# Patient Record
Sex: FEMALE | Race: WHITE | NOT HISPANIC OR LATINO | URBAN - METROPOLITAN AREA
[De-identification: names, ages, dates, MRNs, and addresses within clinical notes are randomized per-mention and may not be internally consistent; named-entity substitution may affect disease eponyms.]

---

## 2023-05-05 ENCOUNTER — INPATIENT (INPATIENT)
Facility: HOSPITAL | Age: 38
LOS: 2 days | Discharge: HOME | DRG: 282 | End: 2023-05-08
Attending: INTERNAL MEDICINE | Admitting: INTERNAL MEDICINE
Payer: MEDICAID

## 2023-05-05 VITALS
SYSTOLIC BLOOD PRESSURE: 165 MMHG | HEIGHT: 64 IN | WEIGHT: 189.6 LBS | OXYGEN SATURATION: 97 % | HEART RATE: 72 BPM | TEMPERATURE: 98 F | DIASTOLIC BLOOD PRESSURE: 89 MMHG | RESPIRATION RATE: 20 BRPM

## 2023-05-05 DIAGNOSIS — K86.1 OTHER CHRONIC PANCREATITIS: ICD-10-CM

## 2023-05-05 LAB
ALBUMIN SERPL ELPH-MCNC: 4.6 G/DL — SIGNIFICANT CHANGE UP (ref 3.5–5.2)
ALP SERPL-CCNC: 191 U/L — HIGH (ref 30–115)
ALT FLD-CCNC: 679 U/L — HIGH (ref 0–41)
ANION GAP SERPL CALC-SCNC: 18 MMOL/L — HIGH (ref 7–14)
AST SERPL-CCNC: 264 U/L — HIGH (ref 0–41)
BASOPHILS # BLD AUTO: 0.02 K/UL — SIGNIFICANT CHANGE UP (ref 0–0.2)
BASOPHILS NFR BLD AUTO: 0.2 % — SIGNIFICANT CHANGE UP (ref 0–1)
BILIRUB DIRECT SERPL-MCNC: 3.1 MG/DL — HIGH (ref 0–0.3)
BILIRUB INDIRECT FLD-MCNC: 0.5 MG/DL — SIGNIFICANT CHANGE UP (ref 0.2–1.2)
BILIRUB SERPL-MCNC: 3.6 MG/DL — HIGH (ref 0.2–1.2)
BUN SERPL-MCNC: 7 MG/DL — LOW (ref 10–20)
CALCIUM SERPL-MCNC: 9.4 MG/DL — SIGNIFICANT CHANGE UP (ref 8.4–10.5)
CHLORIDE SERPL-SCNC: 100 MMOL/L — SIGNIFICANT CHANGE UP (ref 98–110)
CO2 SERPL-SCNC: 21 MMOL/L — SIGNIFICANT CHANGE UP (ref 17–32)
CREAT SERPL-MCNC: 0.7 MG/DL — SIGNIFICANT CHANGE UP (ref 0.7–1.5)
EGFR: 114 ML/MIN/1.73M2 — SIGNIFICANT CHANGE UP
EOSINOPHIL # BLD AUTO: 0 K/UL — SIGNIFICANT CHANGE UP (ref 0–0.7)
EOSINOPHIL NFR BLD AUTO: 0 % — SIGNIFICANT CHANGE UP (ref 0–8)
GLUCOSE SERPL-MCNC: 206 MG/DL — HIGH (ref 70–99)
HCG SERPL QL: NEGATIVE — SIGNIFICANT CHANGE UP
HCT VFR BLD CALC: 43.7 % — SIGNIFICANT CHANGE UP (ref 37–47)
HGB BLD-MCNC: 14.8 G/DL — SIGNIFICANT CHANGE UP (ref 12–16)
IMM GRANULOCYTES NFR BLD AUTO: 0.4 % — HIGH (ref 0.1–0.3)
LACTATE SERPL-SCNC: 1.5 MMOL/L — SIGNIFICANT CHANGE UP (ref 0.7–2)
LIDOCAIN IGE QN: >3000 U/L — HIGH (ref 7–60)
LYMPHOCYTES # BLD AUTO: 1.04 K/UL — LOW (ref 1.2–3.4)
LYMPHOCYTES # BLD AUTO: 8.7 % — LOW (ref 20.5–51.1)
MCHC RBC-ENTMCNC: 30.1 PG — SIGNIFICANT CHANGE UP (ref 27–31)
MCHC RBC-ENTMCNC: 33.9 G/DL — SIGNIFICANT CHANGE UP (ref 32–37)
MCV RBC AUTO: 89 FL — SIGNIFICANT CHANGE UP (ref 81–99)
MONOCYTES # BLD AUTO: 0.44 K/UL — SIGNIFICANT CHANGE UP (ref 0.1–0.6)
MONOCYTES NFR BLD AUTO: 3.7 % — SIGNIFICANT CHANGE UP (ref 1.7–9.3)
NEUTROPHILS # BLD AUTO: 10.38 K/UL — HIGH (ref 1.4–6.5)
NEUTROPHILS NFR BLD AUTO: 87 % — HIGH (ref 42.2–75.2)
NRBC # BLD: 0 /100 WBCS — SIGNIFICANT CHANGE UP (ref 0–0)
PLATELET # BLD AUTO: 342 K/UL — SIGNIFICANT CHANGE UP (ref 130–400)
PMV BLD: 10.2 FL — SIGNIFICANT CHANGE UP (ref 7.4–10.4)
POTASSIUM SERPL-MCNC: 4.3 MMOL/L — SIGNIFICANT CHANGE UP (ref 3.5–5)
POTASSIUM SERPL-SCNC: 4.3 MMOL/L — SIGNIFICANT CHANGE UP (ref 3.5–5)
PROT SERPL-MCNC: 7.8 G/DL — SIGNIFICANT CHANGE UP (ref 6–8)
RBC # BLD: 4.91 M/UL — SIGNIFICANT CHANGE UP (ref 4.2–5.4)
RBC # FLD: 12.8 % — SIGNIFICANT CHANGE UP (ref 11.5–14.5)
SODIUM SERPL-SCNC: 139 MMOL/L — SIGNIFICANT CHANGE UP (ref 135–146)
WBC # BLD: 11.93 K/UL — HIGH (ref 4.8–10.8)
WBC # FLD AUTO: 11.93 K/UL — HIGH (ref 4.8–10.8)

## 2023-05-05 PROCEDURE — 80074 ACUTE HEPATITIS PANEL: CPT

## 2023-05-05 PROCEDURE — 36415 COLL VENOUS BLD VENIPUNCTURE: CPT

## 2023-05-05 PROCEDURE — 87040 BLOOD CULTURE FOR BACTERIA: CPT

## 2023-05-05 PROCEDURE — 80061 LIPID PANEL: CPT

## 2023-05-05 PROCEDURE — 99223 1ST HOSP IP/OBS HIGH 75: CPT

## 2023-05-05 PROCEDURE — 80053 COMPREHEN METABOLIC PANEL: CPT

## 2023-05-05 PROCEDURE — 83735 ASSAY OF MAGNESIUM: CPT

## 2023-05-05 PROCEDURE — 76705 ECHO EXAM OF ABDOMEN: CPT | Mod: 26

## 2023-05-05 PROCEDURE — 88312 SPECIAL STAINS GROUP 1: CPT

## 2023-05-05 PROCEDURE — 74177 CT ABD & PELVIS W/CONTRAST: CPT | Mod: 26,MA

## 2023-05-05 PROCEDURE — 88305 TISSUE EXAM BY PATHOLOGIST: CPT

## 2023-05-05 PROCEDURE — 99285 EMERGENCY DEPT VISIT HI MDM: CPT

## 2023-05-05 PROCEDURE — 85025 COMPLETE CBC W/AUTO DIFF WBC: CPT

## 2023-05-05 RX ORDER — SODIUM CHLORIDE 9 MG/ML
1000 INJECTION, SOLUTION INTRAVENOUS
Refills: 0 | Status: DISCONTINUED | OUTPATIENT
Start: 2023-05-05 | End: 2023-05-05

## 2023-05-05 RX ORDER — PIPERACILLIN AND TAZOBACTAM 4; .5 G/20ML; G/20ML
3.38 INJECTION, POWDER, LYOPHILIZED, FOR SOLUTION INTRAVENOUS EVERY 8 HOURS
Refills: 0 | Status: DISCONTINUED | OUTPATIENT
Start: 2023-05-06 | End: 2023-05-06

## 2023-05-05 RX ORDER — SODIUM CHLORIDE 9 MG/ML
1000 INJECTION, SOLUTION INTRAVENOUS
Refills: 0 | Status: DISCONTINUED | OUTPATIENT
Start: 2023-05-05 | End: 2023-05-07

## 2023-05-05 RX ORDER — ENOXAPARIN SODIUM 100 MG/ML
40 INJECTION SUBCUTANEOUS EVERY 24 HOURS
Refills: 0 | Status: DISCONTINUED | OUTPATIENT
Start: 2023-05-05 | End: 2023-05-08

## 2023-05-05 RX ORDER — KETOROLAC TROMETHAMINE 30 MG/ML
15 SYRINGE (ML) INJECTION ONCE
Refills: 0 | Status: DISCONTINUED | OUTPATIENT
Start: 2023-05-05 | End: 2023-05-05

## 2023-05-05 RX ORDER — PIPERACILLIN AND TAZOBACTAM 4; .5 G/20ML; G/20ML
3.38 INJECTION, POWDER, LYOPHILIZED, FOR SOLUTION INTRAVENOUS ONCE
Refills: 0 | Status: COMPLETED | OUTPATIENT
Start: 2023-05-05 | End: 2023-05-05

## 2023-05-05 RX ORDER — ONDANSETRON 8 MG/1
4 TABLET, FILM COATED ORAL ONCE
Refills: 0 | Status: COMPLETED | OUTPATIENT
Start: 2023-05-05 | End: 2023-05-05

## 2023-05-05 RX ORDER — PANTOPRAZOLE SODIUM 20 MG/1
40 TABLET, DELAYED RELEASE ORAL
Refills: 0 | Status: DISCONTINUED | OUTPATIENT
Start: 2023-05-05 | End: 2023-05-08

## 2023-05-05 RX ORDER — SODIUM CHLORIDE 9 MG/ML
1000 INJECTION INTRAMUSCULAR; INTRAVENOUS; SUBCUTANEOUS ONCE
Refills: 0 | Status: COMPLETED | OUTPATIENT
Start: 2023-05-05 | End: 2023-05-05

## 2023-05-05 RX ORDER — KETOROLAC TROMETHAMINE 30 MG/ML
30 SYRINGE (ML) INJECTION EVERY 6 HOURS
Refills: 0 | Status: DISCONTINUED | OUTPATIENT
Start: 2023-05-05 | End: 2023-05-06

## 2023-05-05 RX ADMIN — Medication 15 MILLIGRAM(S): at 10:15

## 2023-05-05 RX ADMIN — SODIUM CHLORIDE 300 MILLILITER(S): 9 INJECTION, SOLUTION INTRAVENOUS at 20:36

## 2023-05-05 RX ADMIN — ONDANSETRON 4 MILLIGRAM(S): 8 TABLET, FILM COATED ORAL at 10:16

## 2023-05-05 RX ADMIN — SODIUM CHLORIDE 2000 MILLILITER(S): 9 INJECTION INTRAMUSCULAR; INTRAVENOUS; SUBCUTANEOUS at 10:16

## 2023-05-05 RX ADMIN — SODIUM CHLORIDE 300 MILLILITER(S): 9 INJECTION, SOLUTION INTRAVENOUS at 18:00

## 2023-05-05 RX ADMIN — PIPERACILLIN AND TAZOBACTAM 25 GRAM(S): 4; .5 INJECTION, POWDER, LYOPHILIZED, FOR SOLUTION INTRAVENOUS at 20:37

## 2023-05-05 RX ADMIN — ENOXAPARIN SODIUM 40 MILLIGRAM(S): 100 INJECTION SUBCUTANEOUS at 20:37

## 2023-05-05 NOTE — ED ADULT TRIAGE NOTE - CCCP TRG CHIEF CMPLNT
Detail Level: Detailed Consent: The patient's consent was obtained including but not limited to risks of crusting, scabbing, blistering, scarring, darker or lighter pigmentary change, recurrence, incomplete removal and infection. Render Post-Care Instructions In Note?: no Post-Care Instructions: I reviewed with the patient in detail post-care instructions. Patient is to wear sunprotection, and avoid picking at any of the treated lesions. Pt may apply Vaseline to crusted or scabbing areas. Duration Of Freeze Thaw-Cycle (Seconds): 0 Include Z78.9 (Other Specified Conditions Influencing Health Status) As An Associated Diagnosis?: Yes Medical Necessity Clause: This procedure was medically necessary because the lesions that were treated were: Medical Necessity Information: It is in your best interest to select a reason for this procedure from the list below. All of these items fulfill various CMS LCD requirements except the new and changing color options. abdominal pain

## 2023-05-05 NOTE — H&P ADULT - ATTENDING COMMENTS
38 yo female, [place]ymra[place] speaking, with no previous PMH presenting for epigastric pain. Hx goes back to 3 days when pt started having nonbilious non bloody vomiting after which she developed severe epigastric pain associated with diarrhea 2 days ago. Found to have gallstone pancreatitis associated with CBD dilatation.       # Gallstone pancreatitis  # CBD dilatation, choledocholithiasis   - afebrile, WBC of 11k   - Lipase >3000   - ALK Phos 191/ / / TB 3.6/ DB 3.1  - CT AP w IVC; 1.  Acute interstitial edematous pancreatitis; no evidence of pancreatic necrosis or peripancreatic collections. Cholelithiasis with intra-/extrahepatic biliary ductal dilation.   - US Cholelithiasis and gallbladder sludge, without sonographic evidence of acute cholecystitis. CBD dilatation (9 mm). Recommend MRCP to exclude obstructing stone  - NPO for now, advance as tolerated  - IVF: LR @ 300 cc/hr   - pain management: toradol 30 q6h   - advanced GI consult ordered       #DVT: lovenox  #Diet: NPO  # IAT   # PPI: protonix 36 yo female, [place] speaking, with no previous PMH presenting for epigastric pain. Hx goes back to 3 days when pt started having nonbilious non bloody vomiting after which she developed severe epigastric pain associated with diarrhea 2 days ago. Found to have gallstone pancreatitis associated with CBD dilatation.       # Gallstone pancreatitis  # CBD dilatation, choledocholithiasis   - afebrile, WBC of 11k   - Lipase >3000   - ALK Phos 191/ / / TB 3.6/ DB 3.1  - CT AP w IVC; 1.  Acute interstitial edematous pancreatitis; no evidence of pancreatic necrosis or peripancreatic collections. Cholelithiasis with intra-/extrahepatic biliary ductal dilation.   - US Cholelithiasis and gallbladder sludge, without sonographic evidence of acute cholecystitis. CBD dilatation (9 mm). Recommend MRCP to exclude obstructing stone  - NPO for now, advance as tolerated  - IVF: LR @ 250 cc/hr   - pain management: toradol 30 q6h PRN  - advanced GI recs noted:  watch over the weekend. ERCP/EUS on monday. IF gets worse, may consider it sooner.    ALT>AST elevation noted: trend. If does not get better, f/u GI to approve MRI Liver and MRCP. Check Viral Hepatitis Panel.      #DVT: lovenox  #Diet: NPO  # IAT   # PPI: protonix 36 yo female, [place] citizen speaking, with no previous PMH presenting for epigastric pain. Hx goes back to 3 days when pt started having nonbilious non bloody vomiting after which she developed severe epigastric pain associated with diarrhea 2 days ago. Found to have gallstone pancreatitis associated with CBD dilatation.       # Gallstone pancreatitis  # CBD dilatation, choledocholithiasis   - afebrile, WBC of 11k   - Lipase >3000   - ALK Phos 191/ / / TB 3.6/ DB 3.1  - CT AP w IVC; 1.  Acute interstitial edematous pancreatitis; no evidence of pancreatic necrosis or peripancreatic collections. Cholelithiasis with intra-/extrahepatic biliary ductal dilation.   - US Cholelithiasis and gallbladder sludge, without sonographic evidence of acute cholecystitis. CBD dilatation (9 mm). Recommend MRCP to exclude obstructing stone  - NPO for now, advance as tolerated  - IVF: LR @ 250 cc/hr   - pain management: toradol 30 q6h PRN  - Has leukocytosis. Maybe reactional. doubt Cholangitis. Started Zosyn for now. If WBC improves, may d/c Abx.   - advanced GI recs noted:  watch over the weekend. ERCP/EUS on monday. IF gets worse, may consider it sooner.    ALT>AST elevation noted: trend. If does not get better, f/u GI to approve MRI Liver and MRCP. Check Viral Hepatitis Panel.      #DVT: lovenox  #Diet: NPO  # IAT   # PPI: protonix

## 2023-05-05 NOTE — PATIENT PROFILE ADULT - FALL HARM RISK - HARM RISK INTERVENTIONS

## 2023-05-05 NOTE — ED PROVIDER NOTE - OBJECTIVE STATEMENT
37-year-old female, PMH gallbladder stones, p/w intermittent nausea and vomiting and upper epigastric pain x3 days.  + Occasional radiation to back.  + Decreased p.o.  No fever, chest pain, shortness of breath, or urinary symptoms.  + Diarrhea x1 yesterday.  No prior surgery.  LMP now. No sig etoh use. sx have improved.

## 2023-05-05 NOTE — ED ADULT TRIAGE NOTE - CHIEF COMPLAINT QUOTE
Third attempt made to confirm patient appointment NEW pt appointment   c/o abdominal pain with v/d x 3 days

## 2023-05-05 NOTE — CONSULT NOTE ADULT - SUBJECTIVE AND OBJECTIVE BOX
Patient is a 38 y/o female, Icelandic speaking, with no previous PMHx presenting for epigastric pain. She notes pain started months prior but she attributed to gas pains. She developed severe pain three days prior located in the Epigastric area with some radiation towards the right and left upper quadrant. Pain was associated with nausea and emesis along with PO intolerance. No alleviating factors at home. She denies toxic habits.         PAST MEDICAL & SURGICAL HISTORY:  Obesity       MEDICATIONS  (STANDING):  enoxaparin Injectable 40 milliGRAM(s) SubCutaneous every 24 hours  lactated ringers. 1000 milliLiter(s) (300 mL/Hr) IV Continuous <Continuous>  pantoprazole    Tablet 40 milliGRAM(s) Oral before breakfast  piperacillin/tazobactam IVPB. 3.375 Gram(s) IV Intermittent once  piperacillin/tazobactam IVPB.- 3.375 Gram(s) IV Intermittent once    MEDICATIONS  (PRN):  ketorolac   Injectable 30 milliGRAM(s) IV Push every 6 hours PRN Moderate Pain (4 - 6)      Allergies  No Known Allergies        Review of Systems  General:  Denies Fatigue, Denies Fever, Denies Weakness ,Denies Weight Loss   HEENT: Denies Trouble Swallowing ,Denies  Sore Throat , Denies Change in hearing/vision/speech ,Denies Dizziness    Cardio: Denies  Chest Pain , Palpitations    Respiratory: Denies worsening of SOB, Denies Cough  Abdomen: See detailed HPI  Neuro: Denies Headache Denies Dizziness, Denies Paresthesias  MSK: Denies pain in Bones/Joints/Muscles   Psych: Patient denies depression, denies suicidal or homicidal ideations  Integ: Patient Denies rash, or new skin lesions       Vital Signs Last 24 Hrs  T(C): 36.4 (05 May 2023 09:12), Max: 36.4 (05 May 2023 09:12)  T(F): 97.6 (05 May 2023 09:12), Max: 97.6 (05 May 2023 09:12)  HR: 72 (05 May 2023 09:12) (72 - 72)  BP: 165/89 (05 May 2023 09:12) (165/89 - 165/89)  BP(mean): --  RR: 20 (05 May 2023 09:12) (20 - 20)  SpO2: 97% (05 May 2023 09:12) (97% - 97%)    Parameters below as of 05 May 2023 09:12  Patient On (Oxygen Delivery Method): room air    Physical Exam  Gen: NAD  Head: NC/AT, no visible deformity  ENT: PERRLA, Sclera Non Icteric   Cardio: S1/S2 No S3/S4, Regular  Resp: CTA B/L  Abdomen: Soft, ND/ TTP upper abdomen , no guarding   Neuro: AAOx3  Extremities: FROM x 4  Skin: No jaundice, no excoriation         Labs:                        14.8   11.93 )-----------( 342      ( 05 May 2023 10:20 )             43.7       Auto Neutrophil %: 87.0 % (05-05-23 @ 10:20)  Auto Immature Granulocyte %: 0.4 % (05-05-23 @ 10:20)    05-05    139  |  100  |  7<L>  ----------------------------<  206<H>  4.3   |  21  |  0.7      Calcium, Total Serum: 9.4 mg/dL (05-05-23 @ 10:20)      LFTs:             7.8  | 3.6  | 264      ------------------[191     ( 05 May 2023 10:20 )  4.6  | 3.1  | 679         Lipase:>3000  Lactate, Blood: 1.5 mmol/L (05-05-23 @ 10:20)    RADIOLOGY & ADDITIONAL STUDIES:  CT Abdomen and Pelvis w/ IV Cont 05.05.23   IMPRESSION:  1.  Acuteinterstitial edematous pancreatitis; no evidence of pancreatic   necrosis or peripancreatic collections.  2.  Cholelithiasis with intra-/extrahepatic biliary ductal dilation.   Correlate with MRCP.      US Abdomen Upper Quadrant Right 05.05.23   IMPRESSION:  Cholelithiasis and gallbladder sludge, without sonographic evidence of   acute cholecystitis.    CBD dilatation (9 mm). Recommend MRCP to exclude obstructing stone.

## 2023-05-05 NOTE — H&P ADULT - ASSESSMENT
38 yo female, Stateless speaking, with no previous PMH presenting for epigastric pain. Hx goes back to 3 days when pt started having nonbilious non bloody vomiting after which she developed severe epigastric pain associated with diarrhea 2 days ago. Found to have gallstone pancreatitis associated with CBD dilatation.       # Gallstone pancreatitis   - WBC of 11k   - Lipase >3000   - ALK Phos 191/ / / TB 3.6/ DB 3.1  - CT AP w IVC; 1.  Acute interstitial edematous pancreatitis; no evidence of pancreatic necrosis or peripancreatic collections.  - NPO for now, advance as tolerated  - IVF: LR @ 300 cc/hr   - pain management: toradol 30 q6h       # CBD dilatation, r/o cholangitis  - afebrile, WBC 11.9 k  - ALK Phos 191/ / / TB 3.6/ DB 3.1  - Cholelithiasis with intra-/extrahepatic biliary ductal dilation. Correlate with MRCP.  - US Cholelithiasis and gallbladder sludge, without sonographic evidence of acute cholecystitis. CBD dilatation (9 mm). Recommend MRCP to exclude obstructing stone.   - no benefit of MRCP   - will likely need ERCP after pancreatitis resolves  - IV ABX: zosyn   - BCx ordered    - advanced GI consult ordered       #DVT: lovenox  #Diet: NPO  # IAT   # PPI: lovenox  36 yo female, Bangladeshi speaking, with no previous PMH presenting for epigastric pain. Hx goes back to 3 days when pt started having nonbilious non bloody vomiting after which she developed severe epigastric pain associated with diarrhea 2 days ago. Found to have gallstone pancreatitis associated with CBD dilatation.       # Gallstone pancreatitis   - WBC of 11k   - Lipase >3000   - ALK Phos 191/ / / TB 3.6/ DB 3.1  - CT AP w IVC; 1.  Acute interstitial edematous pancreatitis; no evidence of pancreatic necrosis or peripancreatic collections.  - NPO for now, advance as tolerated  - IVF: LR @ 300 cc/hr   - pain management: toradol 30 q6h       # CBD dilatation, r/o cholangitis  - afebrile, WBC 11.9 k  - ALK Phos 191/ / / TB 3.6/ DB 3.1  - Cholelithiasis with intra-/extrahepatic biliary ductal dilation. Correlate with MRCP.  - US Cholelithiasis and gallbladder sludge, without sonographic evidence of acute cholecystitis. CBD dilatation (9 mm). Recommend MRCP to exclude obstructing stone.   - no benefit of MRCP   - will likely need ERCP after pancreatitis resolves  - IV ABX: zosyn   - BCx ordered    - advanced GI consult ordered       #DVT: lovenox  #Diet: NPO  # IAT   # PPI: protonix  36 yo female, [place] speaking, with no previous PMH presenting for epigastric pain. Hx goes back to 3 days when pt started having nonbilious non bloody vomiting after which she developed severe epigastric pain associated with diarrhea 2 days ago. Found to have gallstone pancreatitis associated with CBD dilatation.       # Gallstone pancreatitis  # CBD dilatation, choledocholithiasis   - afebrile, WBC of 11k   - Lipase >3000   - ALK Phos 191/ / / TB 3.6/ DB 3.1  - CT AP w IVC; 1.  Acute interstitial edematous pancreatitis; no evidence of pancreatic necrosis or peripancreatic collections. Cholelithiasis with intra-/extrahepatic biliary ductal dilation.   - US Cholelithiasis and gallbladder sludge, without sonographic evidence of acute cholecystitis. CBD dilatation (9 mm). Recommend MRCP to exclude obstructing stone  - NPO for now, advance as tolerated  - IVF: LR @ 300 cc/hr   - pain management: toradol 30 q6h   - advanced GI consult ordered       #DVT: lovenox  #Diet: NPO  # IAT   # PPI: protonix

## 2023-05-05 NOTE — H&P ADULT - NSHPLABSRESULTS_GEN_ALL_CORE
14.8   11.93 )-----------( 342      ( 05 May 2023 10:20 )             43.7     05-05    139  |  100  |  7<L>  ----------------------------<  206<H>  4.3   |  21  |  0.7    Ca    9.4      05 May 2023 10:20    TPro  7.8  /  Alb  4.6  /  TBili  3.6<H>  /  DBili  3.1<H>  /  AST  264<H>  /  ALT  679<H>  /  AlkPhos  191<H>  05-05

## 2023-05-05 NOTE — ED PROVIDER NOTE - PHYSICAL EXAMINATION
CONSTITUTIONAL: NAD  SKIN: Warm dry  HEAD: NCAT  EYES: NL inspection  ENT: MMM  NECK: Supple; non tender.  CARD: RRR  RESP: CTAB  ABD: Soft, + ttp RUQ and epigastrium, no r/g  EXT: no pedal edema  NEURO: Grossly unremarkable  PSYCH: Cooperative, appropriate.

## 2023-05-05 NOTE — ED PROVIDER NOTE - CARE PLAN
1 Principal Discharge DX:	Pancreatitis  Secondary Diagnosis:	Gall bladder stones  Secondary Diagnosis:	Dilated cbd, acquired

## 2023-05-05 NOTE — ED PROVIDER NOTE - CLINICAL SUMMARY MEDICAL DECISION MAKING FREE TEXT BOX
.    Patient with abdominal pain.  All available lab tests, imaging tests, and EKGs independently reviewed and interpreted by me..  + Dilated CBD, and pancreatitis on CT without necrosis or fluid collection.  Patient likely has gallstone pancreatitis and requires MRCP.  Patient remains hemodynamically stable and comfortable in the emergency department.  No acute events.  Patient admitted to medicine for further management.

## 2023-05-05 NOTE — H&P ADULT - HISTORY OF PRESENT ILLNESS
36 yo female pt with no previous PMH presenting for epigastric pain. Hx goes back to 3 days when pt started having nonbilious non bloody vomiting after which she developed severe epigastric pain associated with diarrhea 2 days ago. Pt never had a similar episode before; however does report on/off  epigastric discomfort for which she had a RUQ US 2 weeks ago and was told she has cholelithiasis. Denies any alcohol intake. No fevers or chills at home.     In ED:   VS stable   Labs significant for WBC of 11k   Lipase   ALK Phos/ AST/ ALT/ TB/ DB   CT AP w IVC   US  36 yo female, Malaysian speaking, with no previous PMH presenting for epigastric pain. Hx goes back to 3 days when pt started having nonbilious non bloody vomiting after which she developed severe epigastric pain associated with diarrhea 2 days ago. Pt unable to tolerate any PO intake 2/2 to pain and vomiting. Pt never had a similar episode before; however does report on/off  epigastric discomfort for which she had a RUQ US 2 weeks ago and was told she has cholelithiasis. Denies any alcohol intake. No fevers or chills at home.     In ED:   VS stable   Labs significant for:   WBC of 11k   Lipase >3000   ALK Phos 191/ / / TB 3.6/ DB 3.1  CT AP w IVC; 1.  Acute interstitial edematous pancreatitis; no evidence of pancreatic necrosis or peripancreatic collections.  2.  Cholelithiasis with intra-/extrahepatic biliary ductal dilation. Correlate with MRCP.    US Cholelithiasis and gallbladder sludge, without sonographic evidence of acute cholecystitis. CBD dilatation (9 mm). Recommend MRCP to exclude obstructing stone.

## 2023-05-06 LAB
ALBUMIN SERPL ELPH-MCNC: 3.6 G/DL — SIGNIFICANT CHANGE UP (ref 3.5–5.2)
ALP SERPL-CCNC: 146 U/L — HIGH (ref 30–115)
ALT FLD-CCNC: 394 U/L — HIGH (ref 0–41)
ANION GAP SERPL CALC-SCNC: 12 MMOL/L — SIGNIFICANT CHANGE UP (ref 7–14)
AST SERPL-CCNC: 98 U/L — HIGH (ref 0–41)
BASOPHILS # BLD AUTO: 0.02 K/UL — SIGNIFICANT CHANGE UP (ref 0–0.2)
BASOPHILS NFR BLD AUTO: 0.2 % — SIGNIFICANT CHANGE UP (ref 0–1)
BILIRUB SERPL-MCNC: 1.3 MG/DL — HIGH (ref 0.2–1.2)
BUN SERPL-MCNC: 7 MG/DL — LOW (ref 10–20)
CALCIUM SERPL-MCNC: 8.4 MG/DL — SIGNIFICANT CHANGE UP (ref 8.4–10.5)
CHLORIDE SERPL-SCNC: 101 MMOL/L — SIGNIFICANT CHANGE UP (ref 98–110)
CO2 SERPL-SCNC: 24 MMOL/L — SIGNIFICANT CHANGE UP (ref 17–32)
CREAT SERPL-MCNC: 0.6 MG/DL — LOW (ref 0.7–1.5)
EGFR: 118 ML/MIN/1.73M2 — SIGNIFICANT CHANGE UP
EOSINOPHIL # BLD AUTO: 0.04 K/UL — SIGNIFICANT CHANGE UP (ref 0–0.7)
EOSINOPHIL NFR BLD AUTO: 0.4 % — SIGNIFICANT CHANGE UP (ref 0–8)
GLUCOSE SERPL-MCNC: 114 MG/DL — HIGH (ref 70–99)
HAV IGM SER-ACNC: SIGNIFICANT CHANGE UP
HBV CORE IGM SER-ACNC: SIGNIFICANT CHANGE UP
HBV SURFACE AG SER-ACNC: SIGNIFICANT CHANGE UP
HCT VFR BLD CALC: 37.3 % — SIGNIFICANT CHANGE UP (ref 37–47)
HCV AB S/CO SERPL IA: 0.11 S/CO — SIGNIFICANT CHANGE UP (ref 0–0.99)
HCV AB SERPL-IMP: SIGNIFICANT CHANGE UP
HGB BLD-MCNC: 12.2 G/DL — SIGNIFICANT CHANGE UP (ref 12–16)
IMM GRANULOCYTES NFR BLD AUTO: 0.4 % — HIGH (ref 0.1–0.3)
LYMPHOCYTES # BLD AUTO: 1.9 K/UL — SIGNIFICANT CHANGE UP (ref 1.2–3.4)
LYMPHOCYTES # BLD AUTO: 19 % — LOW (ref 20.5–51.1)
MAGNESIUM SERPL-MCNC: 1.7 MG/DL — LOW (ref 1.8–2.4)
MCHC RBC-ENTMCNC: 29.7 PG — SIGNIFICANT CHANGE UP (ref 27–31)
MCHC RBC-ENTMCNC: 32.7 G/DL — SIGNIFICANT CHANGE UP (ref 32–37)
MCV RBC AUTO: 90.8 FL — SIGNIFICANT CHANGE UP (ref 81–99)
MONOCYTES # BLD AUTO: 0.85 K/UL — HIGH (ref 0.1–0.6)
MONOCYTES NFR BLD AUTO: 8.5 % — SIGNIFICANT CHANGE UP (ref 1.7–9.3)
NEUTROPHILS # BLD AUTO: 7.16 K/UL — HIGH (ref 1.4–6.5)
NEUTROPHILS NFR BLD AUTO: 71.5 % — SIGNIFICANT CHANGE UP (ref 42.2–75.2)
NRBC # BLD: 0 /100 WBCS — SIGNIFICANT CHANGE UP (ref 0–0)
PLATELET # BLD AUTO: 266 K/UL — SIGNIFICANT CHANGE UP (ref 130–400)
PMV BLD: 10.4 FL — SIGNIFICANT CHANGE UP (ref 7.4–10.4)
POTASSIUM SERPL-MCNC: 3.8 MMOL/L — SIGNIFICANT CHANGE UP (ref 3.5–5)
POTASSIUM SERPL-SCNC: 3.8 MMOL/L — SIGNIFICANT CHANGE UP (ref 3.5–5)
PROT SERPL-MCNC: 6.2 G/DL — SIGNIFICANT CHANGE UP (ref 6–8)
RBC # BLD: 4.11 M/UL — LOW (ref 4.2–5.4)
RBC # FLD: 12.9 % — SIGNIFICANT CHANGE UP (ref 11.5–14.5)
SODIUM SERPL-SCNC: 137 MMOL/L — SIGNIFICANT CHANGE UP (ref 135–146)
WBC # BLD: 10.01 K/UL — SIGNIFICANT CHANGE UP (ref 4.8–10.8)
WBC # FLD AUTO: 10.01 K/UL — SIGNIFICANT CHANGE UP (ref 4.8–10.8)

## 2023-05-06 PROCEDURE — 99233 SBSQ HOSP IP/OBS HIGH 50: CPT

## 2023-05-06 PROCEDURE — 99232 SBSQ HOSP IP/OBS MODERATE 35: CPT

## 2023-05-06 RX ORDER — KETOROLAC TROMETHAMINE 30 MG/ML
15 SYRINGE (ML) INJECTION EVERY 8 HOURS
Refills: 0 | Status: DISCONTINUED | OUTPATIENT
Start: 2023-05-06 | End: 2023-05-08

## 2023-05-06 RX ORDER — MAGNESIUM SULFATE 500 MG/ML
2 VIAL (ML) INJECTION ONCE
Refills: 0 | Status: COMPLETED | OUTPATIENT
Start: 2023-05-06 | End: 2023-05-06

## 2023-05-06 RX ORDER — IBUPROFEN 200 MG
400 TABLET ORAL EVERY 8 HOURS
Refills: 0 | Status: DISCONTINUED | OUTPATIENT
Start: 2023-05-06 | End: 2023-05-08

## 2023-05-06 RX ADMIN — SODIUM CHLORIDE 250 MILLILITER(S): 9 INJECTION, SOLUTION INTRAVENOUS at 05:02

## 2023-05-06 RX ADMIN — PANTOPRAZOLE SODIUM 40 MILLIGRAM(S): 20 TABLET, DELAYED RELEASE ORAL at 05:02

## 2023-05-06 RX ADMIN — SODIUM CHLORIDE 250 MILLILITER(S): 9 INJECTION, SOLUTION INTRAVENOUS at 12:16

## 2023-05-06 RX ADMIN — ENOXAPARIN SODIUM 40 MILLIGRAM(S): 100 INJECTION SUBCUTANEOUS at 21:05

## 2023-05-06 RX ADMIN — Medication 25 GRAM(S): at 12:38

## 2023-05-06 RX ADMIN — SODIUM CHLORIDE 250 MILLILITER(S): 9 INJECTION, SOLUTION INTRAVENOUS at 17:28

## 2023-05-06 RX ADMIN — SODIUM CHLORIDE 250 MILLILITER(S): 9 INJECTION, SOLUTION INTRAVENOUS at 14:10

## 2023-05-06 NOTE — PROGRESS NOTE ADULT - SUBJECTIVE AND OBJECTIVE BOX
Gastroenterology progress note:     Patient is a 37y old  Female who presents with a chief complaint of Gallstone pancreatitis (06 May 2023 09:28)       Admitted on: 05-05-23    We are following the patient for pancreatitis      Interval History:  feeling better   abdominal pain improving   passing gas        PAST MEDICAL & SURGICAL HISTORY:      MEDICATIONS  (STANDING):  enoxaparin Injectable 40 milliGRAM(s) SubCutaneous every 24 hours  lactated ringers. 1000 milliLiter(s) (250 mL/Hr) IV Continuous <Continuous>  pantoprazole    Tablet 40 milliGRAM(s) Oral before breakfast  piperacillin/tazobactam IVPB.. 3.375 Gram(s) IV Intermittent every 8 hours    MEDICATIONS  (PRN):  ketorolac   Injectable 30 milliGRAM(s) IV Push every 6 hours PRN Moderate Pain (4 - 6)      Allergies  No Known Allergies      Review of Systems:   Cardiovascular:  No Chest Pain, No Palpitations  Respiratory:  No Cough, No Dyspnea  Gastrointestinal:  As described in HPI    Physical Examination:  T(C): 37.4 (05-06-23 @ 04:43), Max: 37.4 (05-06-23 @ 04:43)  HR: 89 (05-06-23 @ 04:43) (76 - 89)  BP: 139/69 (05-06-23 @ 04:43) (119/59 - 139/69)  RR: 18 (05-06-23 @ 04:43) (18 - 18)  SpO2: 99% (05-05-23 @ 17:11) (99% - 99%)  Weight (kg): 106 (05-05-23 @ 20:48)    Constitutional: No acute distress.  Respiratory:  No signs of respiratory distress. Lung sounds are clear bilaterally.  Cardiovascular:  S1 S2, Regular rate and rhythm.  Abdominal: Abdomen is soft, symmetric, and non-tender without distention.      Data:                        12.2   10.01 )-----------( 266      ( 06 May 2023 07:38 )             37.3     Hgb trend:  12.2  05-06-23 @ 07:38  14.8  05-05-23 @ 10:20      05-06    137  |  101  |  7<L>  ----------------------------<  114<H>  3.8   |  24  |  0.6<L>    Ca    8.4      06 May 2023 07:38  Mg     1.7     05-06    TPro  6.2  /  Alb  3.6  /  TBili  1.3<H>  /  DBili  x   /  AST  98<H>  /  ALT  394<H>  /  AlkPhos  146<H>  05-06    Liver panel trend:  TBili 1.3   /   AST 98   /      /   AlkP 146   /   Tptn 6.2   /   Alb 3.6    /   DBili --      05-06  TBili 3.6   /      /      /   AlkP 191   /   Tptn 7.8   /   Alb 4.6    /   DBili 3.1      05-05             Radiology:  CT Abdomen and Pelvis w/ IV Cont:   ACC: [id number] EXAM:  CT ABDOMEN AND PELVIS IC   ORDERED BY: SHIV VAZQUEZ     PROCEDURE DATE:  05/05/2023          INTERPRETATION:  CLINICAL STATEMENT: Abdominal pain    TECHNIQUE: Contiguous axial CT images were obtained from the lower chest   to the pubic symphysis following administration of 100cc Omnipaque 350   intravenous contrast.  Oral contrast was not administered.  Reformatted   images in the coronal and sagittal planes were acquired.    COMPARISON CT: None.    OTHER STUDIES USED FOR CORRELATION: None.      FINDINGS:    LOWER CHEST: Bibasilar subsegmental dependent atelectasis.    HEPATOBILIARY: Mild diffuse hepatic steatosis. Cholelithiasis. Dilated   common bile duct measures 1.3 cm; mild central intrahepatic biliary   ductal dilation.    SPLEEN: Unremarkable.    PANCREAS: Diffuse peripancreatic fat stranding. Homogeneous enhancement   of the parenchyma noted. No peripancreatic fluid collections. Patent   splenic vein.    ADRENAL GLANDS: Unremarkable.    KIDNEYS: Symmetric renal enhancement bilaterally. No hydronephrosis.    ABDOMINOPELVIC NODES: No lymphadenopathy.    PELVIC ORGANS: Small calcified uterine fibroid.    PERITONEUM/MESENTERY/BOWEL: No bowel obstruction, ascites or   pneumoperitoneum. Normal appendix. Nonspecific dense ingested material   within the distal ileum.    BONES/SOFT TISSUES: No acute osseous abnormality. Degenerative changes at   the pubic symphysis and right sacroiliac joint. Tiny fat-containing   umbilical hernia.      IMPRESSION:  1.  Acuteinterstitial edematous pancreatitis; no evidence of pancreatic   necrosis or peripancreatic collections.  2.  Cholelithiasis with intra-/extrahepatic biliary ductal dilation.   Correlate with MRCP.    --- End of Report ---            YELITZA SNYDER MD;Attending Radiologist  This document has been electronically signed. May  5 2023  1:11PM (05-05-23 @ 12:49)    US Abdomen Upper Quadrant Right:   ACC: [id number] EXAM:  US ABDOMEN RT UPR QUADRANT   ORDERED BY: SHIVANGE VAZQUEZ     PROCEDURE DATE:  05/05/2023          INTERPRETATION:  CLINICAL INFORMATION: Right upper quadrant/epigastric   pain    COMPARISON: None available.    TECHNIQUE: Sonography of the right upper quadrant.    FINDINGS:  Liver: Increased echogenicity.  Bile ducts: Normal caliber. Common bile duct measures 9 mm.  Gallbladder: Cholelithiasis and gallbladder sludge.  No focal tenderness   or evidence of cholecystitis.  Pancreas: Visualized portions are within normal limits.  Right kidney: 9.4 cm. No hydronephrosis.  Ascites: None.  IVC: Visualized portions are within normal limits.    IMPRESSION:  Cholelithiasis and gallbladder sludge, without sonographic evidence of   acute cholecystitis.    CBD dilatation (9 mm). Recommend MRCP to exclude obstructing stone.    --- End of Report ---            MARKO DEGROOT MD; Attending Radiologist  This document has been electronically signed. May  5 2023 11:35AM (05-05-23 @ 10:57)     Gastroenterology progress note:     Patient is a 37y old  Female who presents with a chief complaint of Gallstone pancreatitis (06 May 2023 09:28)       Admitted on: 05-05-23    We are following the patient for pancreatitis      Interval History:  feeling better   abdominal pain improving   passing gas        PAST MEDICAL & SURGICAL HISTORY:      MEDICATIONS  (STANDING):  enoxaparin Injectable 40 milliGRAM(s) SubCutaneous every 24 hours  lactated ringers. 1000 milliLiter(s) (250 mL/Hr) IV Continuous <Continuous>  pantoprazole    Tablet 40 milliGRAM(s) Oral before breakfast  piperacillin/tazobactam IVPB.. 3.375 Gram(s) IV Intermittent every 8 hours    MEDICATIONS  (PRN):  ketorolac   Injectable 30 milliGRAM(s) IV Push every 6 hours PRN Moderate Pain (4 - 6)      Allergies  No Known Allergies      Review of Systems:   Cardiovascular:  No Chest Pain, No Palpitations  Respiratory:  No Cough, No Dyspnea  Gastrointestinal:  As described in HPI    Physical Examination:  T(C): 37.4 (05-06-23 @ 04:43), Max: 37.4 (05-06-23 @ 04:43)  HR: 89 (05-06-23 @ 04:43) (76 - 89)  BP: 139/69 (05-06-23 @ 04:43) (119/59 - 139/69)  RR: 18 (05-06-23 @ 04:43) (18 - 18)  SpO2: 99% (05-05-23 @ 17:11) (99% - 99%)  Weight (kg): 106 (05-05-23 @ 20:48)    Constitutional: No acute distress.  Respiratory:  No signs of respiratory distress. Lung sounds are clear bilaterally.  Cardiovascular:  S1 S2, Regular rate and rhythm.  Abdominal: Abdomen is soft, symmetric, and non-tender without distention.      Data:                        12.2   10.01 )-----------( 266      ( 06 May 2023 07:38 )             37.3     Hgb trend:  12.2  05-06-23 @ 07:38  14.8  05-05-23 @ 10:20      05-06    137  |  101  |  7<L>  ----------------------------<  114<H>  3.8   |  24  |  0.6<L>    Ca    8.4      06 May 2023 07:38  Mg     1.7     05-06    TPro  6.2  /  Alb  3.6  /  TBili  1.3<H>  /  DBili  x   /  AST  98<H>  /  ALT  394<H>  /  AlkPhos  146<H>  05-06    Liver panel trend:  TBili 1.3   /   AST 98   /      /   AlkP 146   /   Tptn 6.2   /   Alb 3.6    /   DBili --      05-06  TBili 3.6   /      /      /   AlkP 191   /   Tptn 7.8   /   Alb 4.6    /   DBili 3.1      05-05             Radiology:  CT Abdomen and Pelvis w/ IV Cont:   ACC: [id number] EXAM:  CT ABDOMEN AND PELVIS IC   ORDERED BY: SHIV VAZQUEZ     PROCEDURE DATE:  05/05/2023          INTERPRETATION:  CLINICAL STATEMENT: Abdominal pain    TECHNIQUE: Contiguous axial CT images were obtained from the lower chest   to the pubic symphysis following administration of 100cc Omnipaque 350   intravenous contrast.  Oral contrast was not administered.  Reformatted   images in the coronal and sagittal planes were acquired.    COMPARISON CT: None.    OTHER STUDIES USED FOR CORRELATION: None.      FINDINGS:    LOWER CHEST: Bibasilar subsegmental dependent atelectasis.    HEPATOBILIARY: Mild diffuse hepatic steatosis. Cholelithiasis. Dilated   common bile duct measures 1.3 cm; mild central intrahepatic biliary   ductal dilation.    SPLEEN: Unremarkable.    PANCREAS: Diffuse peripancreatic fat stranding. Homogeneous enhancement   of the parenchyma noted. No peripancreatic fluid collections. Patent   splenic vein.    ADRENAL GLANDS: Unremarkable.    KIDNEYS: Symmetric renal enhancement bilaterally. No hydronephrosis.    ABDOMINOPELVIC NODES: No lymphadenopathy.    PELVIC ORGANS: Small calcified uterine fibroid.    PERITONEUM/MESENTERY/BOWEL: No bowel obstruction, ascites or   pneumoperitoneum. Normal appendix. Nonspecific dense ingested material   within the distal ileum.    BONES/SOFT TISSUES: No acute osseous abnormality. Degenerative changes at   the pubic symphysis and right sacroiliac joint. Tiny fat-containing   umbilical hernia.      IMPRESSION:  1.  Acute interstitial edematous pancreatitis; no evidence of pancreatic   necrosis or peripancreatic collections.  2.  Cholelithiasis with intra-/extrahepatic biliary ductal dilation.   Correlate with MRCP.    --- End of Report ---            YELITZA SNYDER MD;Attending Radiologist  This document has been electronically signed. May  5 2023  1:11PM (05-05-23 @ 12:49)    US Abdomen Upper Quadrant Right:   ACC: [id number] EXAM:  US ABDOMEN RT UPR QUADRANT   ORDERED BY: SHIV VAZQUEZ     PROCEDURE DATE:  05/05/2023          INTERPRETATION:  CLINICAL INFORMATION: Right upper quadrant/epigastric   pain    COMPARISON: None available.    TECHNIQUE: Sonography of the right upper quadrant.    FINDINGS:  Liver: Increased echogenicity.  Bile ducts: Normal caliber. Common bile duct measures 9 mm.  Gallbladder: Cholelithiasis and gallbladder sludge.  No focal tenderness   or evidence of cholecystitis.  Pancreas: Visualized portions are within normal limits.  Right kidney: 9.4 cm. No hydronephrosis.  Ascites: None.  IVC: Visualized portions are within normal limits.    IMPRESSION:  Cholelithiasis and gallbladder sludge, without sonographic evidence of   acute cholecystitis.    CBD dilatation (9 mm). Recommend MRCP to exclude obstructing stone.    --- End of Report ---            MARKO DEGROOT MD; Attending Radiologist  This document has been electronically signed. May  5 2023 11:35AM (05-05-23 @ 10:57)

## 2023-05-06 NOTE — PROGRESS NOTE ADULT - SUBJECTIVE AND OBJECTIVE BOX
BREANNE KATI  37y  Female      Patient is a 37y old  Female who presents with a chief complaint of Gallstone pancreatitis (05 May 2023 17:10)      INTERVAL HPI/OVERNIGHT EVENTS:    Vital Signs Last 24 Hrs  T(C): 37.4 (06 May 2023 04:43), Max: 37.4 (06 May 2023 04:43)  T(F): 99.3 (06 May 2023 04:43), Max: 99.3 (06 May 2023 04:43)  HR: 89 (06 May 2023 04:43) (76 - 89)  BP: 139/69 (06 May 2023 04:43) (119/59 - 139/69)  BP(mean): 96 (06 May 2023 04:43) (96 - 96)  RR: 18 (06 May 2023 04:43) (18 - 18)  SpO2: 99% (05 May 2023 17:11) (99% - 99%)    Parameters below as of 06 May 2023 04:43  Patient On (Oxygen Delivery Method): room air      PHYSICAL EXAM:  GENERAL: NAD, well-groomed, well-developed  PSYCH: no agitation, baseline mentation  NERVOUS SYSTEM:  Alert & Oriented X3, Motor Strength 5/5 B/L upper and lower extremities; Sensory intact; FTN WNL  PULMONARY: Clear to percussion bilaterally; No rales, rhonchi, wheezing, or rubs  CARDIOVASCULAR: Regular rate and rhythm; No murmurs, rubs, or gallops  GI: Soft, Nontender, Nondistended; Bowel sounds present  EXTREMITIES:  2+ Peripheral Pulses, No clubbing, cyanosis, or edema  LYMPH: No lymphadenopathy noted  SKIN: No rashes or lesions    Consultant(s) Notes Reviewed:  [x ] YES  [ ] NO    Discussed with Consultants/Other Providers [ x] YES     LABS                          14.8   11.93 )-----------( 342      ( 05 May 2023 10:20 )             43.7     05-05    139  |  100  |  7<L>  ----------------------------<  206<H>  4.3   |  21  |  0.7    Ca    9.4      05 May 2023 10:20    TPro  7.8  /  Alb  4.6  /  TBili  3.6<H>  /  DBili  3.1<H>  /  AST  264<H>  /  ALT  679<H>  /  AlkPhos  191<H>  05-05      Lactate Trend  05-05 @ 10:20 Lactate:1.5       RADIOLOGY & ADDITIONAL TESTS:    Imaging Personally Reviewed:  [ ] YES  [ ] NO           BREANNE KATI  37y  Female      Patient is a 37y old  Female who presents with a chief complaint of Gallstone pancreatitis (05 May 2023 17:10)      INTERVAL HPI/OVERNIGHT EVENTS:  Pt feels well today, denied any pain/nausea/vomiting. Afebrile.     Vital Signs Last 24 Hrs  T(C): 37.4 (06 May 2023 04:43), Max: 37.4 (06 May 2023 04:43)  T(F): 99.3 (06 May 2023 04:43), Max: 99.3 (06 May 2023 04:43)  HR: 89 (06 May 2023 04:43) (76 - 89)  BP: 139/69 (06 May 2023 04:43) (119/59 - 139/69)  BP(mean): 96 (06 May 2023 04:43) (96 - 96)  RR: 18 (06 May 2023 04:43) (18 - 18)  SpO2: 99% (05 May 2023 17:11) (99% - 99%)    Parameters below as of 06 May 2023 04:43  Patient On (Oxygen Delivery Method): room air      PHYSICAL EXAM:  GENERAL: NAD, well-groomed, obese  PSYCH: no agitation, baseline mentation  NERVOUS SYSTEM:  Alert & Oriented X3, Motor Strength 5/5 B/L upper and lower extremities; Sensory intact; FTN WNL  PULMONARY: Clear to percussion bilaterally; No rales, rhonchi, wheezing, or rubs  CARDIOVASCULAR: Regular rate and rhythm; No murmurs, rubs, or gallops  GI: Soft, Nontender, Nondistended; Bowel sounds present  EXTREMITIES:  2+ Peripheral Pulses, No clubbing, cyanosis, or edema  LYMPH: No lymphadenopathy noted  SKIN: No rashes or lesions    Consultant(s) Notes Reviewed:  [x ] YES  [ ] NO    Discussed with Consultants/Other Providers [ x] YES     LABS                          14.8   11.93 )-----------( 342      ( 05 May 2023 10:20 )             43.7     05-05    139  |  100  |  7<L>  ----------------------------<  206<H>  4.3   |  21  |  0.7    Ca    9.4      05 May 2023 10:20    TPro  7.8  /  Alb  4.6  /  TBili  3.6<H>  /  DBili  3.1<H>  /  AST  264<H>  /  ALT  679<H>  /  AlkPhos  191<H>  05-05      Lactate Trend  05-05 @ 10:20 Lactate:1.5       RADIOLOGY & ADDITIONAL TESTS:    Imaging Personally Reviewed:  [ ] YES  [ ] NO           KATI RAYMUNDO  37y  Female      Patient is a 37y old  Female who presents with a chief complaint of Gallstone pancreatitis (05 May 2023 17:10)    : Pt refused  services; translation provided by her .     INTERVAL HPI/OVERNIGHT EVENTS:  Pt feels well today, denied any pain/nausea/vomiting. Afebrile.     Vital Signs Last 24 Hrs  T(C): 37.4 (06 May 2023 04:43), Max: 37.4 (06 May 2023 04:43)  T(F): 99.3 (06 May 2023 04:43), Max: 99.3 (06 May 2023 04:43)  HR: 89 (06 May 2023 04:43) (76 - 89)  BP: 139/69 (06 May 2023 04:43) (119/59 - 139/69)  BP(mean): 96 (06 May 2023 04:43) (96 - 96)  RR: 18 (06 May 2023 04:43) (18 - 18)  SpO2: 99% (05 May 2023 17:11) (99% - 99%)    Parameters below as of 06 May 2023 04:43  Patient On (Oxygen Delivery Method): room air      PHYSICAL EXAM:  GENERAL: NAD, well-groomed, obese  PSYCH: no agitation, baseline mentation  NERVOUS SYSTEM:  Alert & Oriented X3, Motor Strength 5/5 B/L upper and lower extremities; Sensory intact; FTN WNL  PULMONARY: Clear to percussion bilaterally; No rales, rhonchi, wheezing, or rubs  CARDIOVASCULAR: Regular rate and rhythm; No murmurs, rubs, or gallops  GI: Soft, Nontender, Nondistended; Bowel sounds present  EXTREMITIES:  2+ Peripheral Pulses, No clubbing, cyanosis, or edema  LYMPH: No lymphadenopathy noted  SKIN: No rashes or lesions    Consultant(s) Notes Reviewed:  [x ] YES  [ ] NO    Discussed with Consultants/Other Providers [ x] YES     LABS                          14.8   11.93 )-----------( 342      ( 05 May 2023 10:20 )             43.7     05-05    139  |  100  |  7<L>  ----------------------------<  206<H>  4.3   |  21  |  0.7    Ca    9.4      05 May 2023 10:20    TPro  7.8  /  Alb  4.6  /  TBili  3.6<H>  /  DBili  3.1<H>  /  AST  264<H>  /  ALT  679<H>  /  AlkPhos  191<H>  05-05      Lactate Trend  05-05 @ 10:20 Lactate:1.5       RADIOLOGY & ADDITIONAL TESTS:    Imaging Personally Reviewed:  [ ] YES  [ ] NO

## 2023-05-06 NOTE — PROGRESS NOTE ADULT - ASSESSMENT
36 y/o female, Bhutanese speaking, with no previous PMHx presenting for epigastric pain admitted for pancreatitis. [facility] was consulted for pancreatitis and elevated LFT    #)Epigastric pain/Gallstone Pancreatitis  #)Acute interstitial pancreatitis (secondary to  gall stones) improving   #)Elevated LFT mixed pattern with predominantly hepatocellular with hyperbilurubinemia r/o choledocholithiasis   -drinks wine occasional not every day   -Hemodynamically stable/ No signs of sepsis  -US showed +GS, CBD 9mm  -CT scan showed CBD 1.3cm, hepatic steatosis, Diffuse peripancreatic fat stranding.  -Lipase>3000  -normal calcium, no new medications   -LFT at the time of admission 3.6/191/264/679, trending down today 1.3/146/98/394    Rec:  can start on low fat diet   check lipid profile   c/w IVF LR can taper IV fluids once start tolerating diet   Monitor daily HCT/BUN/CR/urine output.  Monitor pain scale everyday and give pain medications accordingly.  Avoid alcohol and pancreatitis causing medications.  plan for ERCP monday   If concern for cholangitis or Biliary obstruction need emergent ERCP  surgery evaluation for cholecystectomy  38 y/o female, Iranian speaking, with no previous PMHx presenting for epigastric pain admitted for pancreatitis. [facility] was consulted for pancreatitis and elevated LFT    #)Epigastric pain/Gallstone Pancreatitis  #)Acute interstitial pancreatitis (secondary to  gall stones) improving   #)Elevated LFT mixed pattern with predominantly hepatocellular with hyperbilirubinemia r/o choledocholithiasis   -drinks wine occasional not every day   -Hemodynamically stable/ No signs of sepsis  -US showed +GS, CBD 9mm  -CT scan showed CBD 1.3cm, hepatic steatosis, Diffuse peripancreatic fat stranding.  -Lipase>3000  -normal calcium, no new medications   -LFT at the time of admission 3.6/191/264/679, trending down today 1.3/146/98/394    Rec:  can start on low fat diet   check lipid profile   c/w IVF LR can taper IV fluids once start tolerating diet   Monitor daily HCT/BUN/CR/urine output.  Monitor pain scale everyday and give pain medications accordingly.  Avoid alcohol and pancreatitis causing medications.  will consider EUS+/-  ERCP Monday based on patient clinical picture and LFTs , keep NPO Sunday after midnight   If concern for cholangitis or Biliary obstruction need emergent ERCP  surgery evaluation for cholecystectomy

## 2023-05-06 NOTE — PROGRESS NOTE ADULT - ASSESSMENT
36 yo female, Kittitian speaking, with no previous PMH presenting for epigastric pain. Hx goes back to 3 days when pt started having nonbilious non bloody vomiting after which she developed severe epigastric pain associated with diarrhea 2 days ago. Found to have gallstone pancreatitis associated with CBD dilatation.       # Gallstone pancreatitis  # CBD dilatation, choledocholithiasis   - afebrile, WBC of 11k   - Lipase >3000   - ALK Phos 191/ / / TB 3.6/ DB 3.1  - CT AP w IVC; 1.  Acute interstitial edematous pancreatitis; no evidence of pancreatic necrosis or peripancreatic collections. Cholelithiasis with intra-/extrahepatic biliary ductal dilation.   - US Cholelithiasis and gallbladder sludge, without sonographic evidence of acute cholecystitis. CBD dilatation (9 mm). Recommend MRCP to exclude obstructing stone  - NPO for now, advance as tolerated  - IVF: LR @ 250 cc/hr   - pain management: toradol 30 q6h PRN  - Has leukocytosis. Maybe reactional. doubt Cholangitis. Started Zosyn for now. If WBC improves, may d/c Abx.   - [facility] recs noted: watch over the weekend. ERCP/EUS on Monday. IF gets worse, may consider it sooner.    ALT>AST elevation noted: trend. If does not get better, f/u GI to approve MRI Liver and MRCP. Check Viral Hepatitis Panel.      #DVT: lovenox  #Diet: NPO  # IAT   # PPI: protonix.    38 yo female, Czech speaking, with no previous PMH presenting for epigastric pain. Hx goes back to 3 days when pt started having nonbilious non bloody vomiting after which she developed severe epigastric pain associated with diarrhea 2 days ago. Found to have gallstone pancreatitis associated with CBD dilatation.       # Gallstone pancreatitis  # CBD dilatation, choledocholithiasis   - afebrile, WBC of 11k   - Lipase >3000   - ALK Phos 191/ / / TB 3.6/ DB 3.1  - CT AP w IVC; 1.  Acute interstitial edematous pancreatitis; no evidence of pancreatic necrosis or peripancreatic collections. Cholelithiasis with intra-/extrahepatic biliary ductal dilation.   - US Cholelithiasis and gallbladder sludge, without sonographic evidence of acute cholecystitis. CBD dilatation (9 mm). Recommend MRCP to exclude obstructing stone  - Advance to low fat diet today  - IVF: LR @ 250 cc/hr   - pain management: Decrease Toradol to 15mg q8hrly prn   - DC Abx as no signs/symptoms of cholangitis  - Check lipid profile.   - [facility] recs noted: watch over the weekend. ERCP/EUS on Monday. IF gets worse, may consider it sooner.    ALT>AST elevation noted: trending down  - Check Viral Hepatitis Panel.      #DVT: lovenox  #Diet: Low fat  # IAT   # PPI: protonix.     Pending: ERCP/EUS on Monday   36 yo female, Puerto Rican speaking, with no previous PMH presenting for epigastric pain. Hx goes back to 3 days when pt started having nonbilious non bloody vomiting after which she developed severe epigastric pain associated with diarrhea 2 days ago. Found to have gallstone pancreatitis associated with CBD dilatation.       # Gallstone pancreatitis  # CBD dilatation, choledocholithiasis   - afebrile, WBC of 11k   - Lipase >3000   - ALK Phos 191/ / / TB 3.6/ DB 3.1  - CT AP w IVC; 1.  Acute interstitial edematous pancreatitis; no evidence of pancreatic necrosis or peripancreatic collections. Cholelithiasis with intra-/extrahepatic biliary ductal dilation.   - US Cholelithiasis and gallbladder sludge, without sonographic evidence of acute cholecystitis. CBD dilatation (9 mm). Recommend MRCP to exclude obstructing stone  - Advance to low fat diet today  - IVF: LR @ 250 cc/hr   - pain management: Decrease Toradol to 15mg q8hrly prn   - DC Abx as no signs/symptoms of cholangitis  - Check lipid profile.   - [facility] GI recs noted: watch over the weekend. ERCP/EUS on Monday. IF gets worse, may consider it sooner.  - Surgery consult for possible cholecystectomy    ALT>AST elevation noted: trending down  - Check Viral Hepatitis Panel.      #DVT: lovenox  #Diet: Low fat  # IAT   # PPI: protonix.     Pending: ERCP/EUS on Monday   36 yo female, Palestinian speaking, with no previous PMH presenting for epigastric pain. Hx goes back to 3 days when pt started having nonbilious non bloody vomiting after which she developed severe epigastric pain associated with diarrhea 2 days ago. Found to have gallstone pancreatitis associated with CBD dilatation.       # Gallstone pancreatitis  # CBD dilatation, choledocholithiasis   - afebrile, WBC of 11k   - Lipase >3000   - ALK Phos 191/ / / TB 3.6/ DB 3.1  - CT AP w IVC; 1.  Acute interstitial edematous pancreatitis; no evidence of pancreatic necrosis or peripancreatic collections. Cholelithiasis with intra-/extrahepatic biliary ductal dilation.   - US Cholelithiasis and gallbladder sludge, without sonographic evidence of acute cholecystitis. CBD dilatation (9 mm). Recommend MRCP to exclude obstructing stone  - Advance to low fat diet today  - IVF: LR @ 250 cc/hr : taper once able to tolerate PO  - pain management: Decrease Toradol to 15mg q8hrly prn   - DC Abx as no signs/symptoms of cholangitis  - Check lipid profile.   - [facility] recs noted: watch over the weekend. ERCP/EUS on Monday. IF gets worse, may consider it sooner.  - Surgery consult for possible cholecystectomy    ALT>AST elevation noted: trending down  - Check Viral Hepatitis Panel.    Hypomagnesemia  - replete mg today      #DVT: lovenox  #Diet: Low fat  # IAT   # PPI: protonix.     Pending: ERCP/EUS on Monday

## 2023-05-07 LAB
ALBUMIN SERPL ELPH-MCNC: 3.8 G/DL — SIGNIFICANT CHANGE UP (ref 3.5–5.2)
ALP SERPL-CCNC: 136 U/L — HIGH (ref 30–115)
ALT FLD-CCNC: 284 U/L — HIGH (ref 0–41)
ANION GAP SERPL CALC-SCNC: 13 MMOL/L — SIGNIFICANT CHANGE UP (ref 7–14)
AST SERPL-CCNC: 50 U/L — HIGH (ref 0–41)
BASOPHILS # BLD AUTO: 0.03 K/UL — SIGNIFICANT CHANGE UP (ref 0–0.2)
BASOPHILS NFR BLD AUTO: 0.3 % — SIGNIFICANT CHANGE UP (ref 0–1)
BILIRUB SERPL-MCNC: 0.9 MG/DL — SIGNIFICANT CHANGE UP (ref 0.2–1.2)
BUN SERPL-MCNC: 5 MG/DL — LOW (ref 10–20)
CALCIUM SERPL-MCNC: 8.4 MG/DL — SIGNIFICANT CHANGE UP (ref 8.4–10.5)
CHLORIDE SERPL-SCNC: 103 MMOL/L — SIGNIFICANT CHANGE UP (ref 98–110)
CHOLEST SERPL-MCNC: 199 MG/DL — SIGNIFICANT CHANGE UP
CO2 SERPL-SCNC: 25 MMOL/L — SIGNIFICANT CHANGE UP (ref 17–32)
CREAT SERPL-MCNC: 0.5 MG/DL — LOW (ref 0.7–1.5)
EGFR: 124 ML/MIN/1.73M2 — SIGNIFICANT CHANGE UP
EOSINOPHIL # BLD AUTO: 0.08 K/UL — SIGNIFICANT CHANGE UP (ref 0–0.7)
EOSINOPHIL NFR BLD AUTO: 0.8 % — SIGNIFICANT CHANGE UP (ref 0–8)
GLUCOSE SERPL-MCNC: 120 MG/DL — HIGH (ref 70–99)
HCT VFR BLD CALC: 38.7 % — SIGNIFICANT CHANGE UP (ref 37–47)
HDLC SERPL-MCNC: 32 MG/DL — LOW
HGB BLD-MCNC: 12.8 G/DL — SIGNIFICANT CHANGE UP (ref 12–16)
IMM GRANULOCYTES NFR BLD AUTO: 0.3 % — SIGNIFICANT CHANGE UP (ref 0.1–0.3)
LIPID PNL WITH DIRECT LDL SERPL: 130 MG/DL — HIGH
LYMPHOCYTES # BLD AUTO: 2.36 K/UL — SIGNIFICANT CHANGE UP (ref 1.2–3.4)
LYMPHOCYTES # BLD AUTO: 24.3 % — SIGNIFICANT CHANGE UP (ref 20.5–51.1)
MAGNESIUM SERPL-MCNC: 2.1 MG/DL — SIGNIFICANT CHANGE UP (ref 1.8–2.4)
MCHC RBC-ENTMCNC: 30 PG — SIGNIFICANT CHANGE UP (ref 27–31)
MCHC RBC-ENTMCNC: 33.1 G/DL — SIGNIFICANT CHANGE UP (ref 32–37)
MCV RBC AUTO: 90.6 FL — SIGNIFICANT CHANGE UP (ref 81–99)
MONOCYTES # BLD AUTO: 0.79 K/UL — HIGH (ref 0.1–0.6)
MONOCYTES NFR BLD AUTO: 8.1 % — SIGNIFICANT CHANGE UP (ref 1.7–9.3)
NEUTROPHILS # BLD AUTO: 6.41 K/UL — SIGNIFICANT CHANGE UP (ref 1.4–6.5)
NEUTROPHILS NFR BLD AUTO: 66.2 % — SIGNIFICANT CHANGE UP (ref 42.2–75.2)
NON HDL CHOLESTEROL: 167 MG/DL — HIGH
NRBC # BLD: 0 /100 WBCS — SIGNIFICANT CHANGE UP (ref 0–0)
PLATELET # BLD AUTO: 293 K/UL — SIGNIFICANT CHANGE UP (ref 130–400)
PMV BLD: 10.3 FL — SIGNIFICANT CHANGE UP (ref 7.4–10.4)
POTASSIUM SERPL-MCNC: 4 MMOL/L — SIGNIFICANT CHANGE UP (ref 3.5–5)
POTASSIUM SERPL-SCNC: 4 MMOL/L — SIGNIFICANT CHANGE UP (ref 3.5–5)
PROT SERPL-MCNC: 6.5 G/DL — SIGNIFICANT CHANGE UP (ref 6–8)
RBC # BLD: 4.27 M/UL — SIGNIFICANT CHANGE UP (ref 4.2–5.4)
RBC # FLD: 12.5 % — SIGNIFICANT CHANGE UP (ref 11.5–14.5)
SODIUM SERPL-SCNC: 141 MMOL/L — SIGNIFICANT CHANGE UP (ref 135–146)
TRIGL SERPL-MCNC: 182 MG/DL — HIGH
WBC # BLD: 9.7 K/UL — SIGNIFICANT CHANGE UP (ref 4.8–10.8)
WBC # FLD AUTO: 9.7 K/UL — SIGNIFICANT CHANGE UP (ref 4.8–10.8)

## 2023-05-07 PROCEDURE — 99233 SBSQ HOSP IP/OBS HIGH 50: CPT

## 2023-05-07 PROCEDURE — 99232 SBSQ HOSP IP/OBS MODERATE 35: CPT

## 2023-05-07 RX ORDER — SODIUM CHLORIDE 9 MG/ML
1000 INJECTION, SOLUTION INTRAVENOUS
Refills: 0 | Status: DISCONTINUED | OUTPATIENT
Start: 2023-05-08 | End: 2023-05-08

## 2023-05-07 RX ADMIN — ENOXAPARIN SODIUM 40 MILLIGRAM(S): 100 INJECTION SUBCUTANEOUS at 22:13

## 2023-05-07 RX ADMIN — PANTOPRAZOLE SODIUM 40 MILLIGRAM(S): 20 TABLET, DELAYED RELEASE ORAL at 05:03

## 2023-05-07 NOTE — PROGRESS NOTE ADULT - SUBJECTIVE AND OBJECTIVE BOX
KATI RAYMUNDO  37y Female    CHIEF COMPLAINT:    Patient is a 37y old  Female who presents with a chief complaint of Gallstone pancreatitis (07 May 2023 11:59)      INTERVAL HPI/OVERNIGHT EVENTS:    Patient seen and examined. No acute events overnight.     ROS: All other systems are negative.    Vital Signs:    T(F): 98.6 (05-07-23 @ 13:20), Max: 98.6 (05-07-23 @ 13:20)  HR: 87 (05-07-23 @ 13:20) (87 - 101)  BP: 144/72 (05-07-23 @ 13:20) (127/73 - 157/89)  RR: 18 (05-07-23 @ 13:20) (18 - 18)  SpO2: --  I&O's Summary    07 May 2023 07:01  -  07 May 2023 16:56  --------------------------------------------------------  IN: 360 mL / OUT: 0 mL / NET: 360 mL      Daily     Daily   CAPILLARY BLOOD GLUCOSE          PHYSICAL EXAM:    GENERAL:  NAD  SKIN: No rashes or lesions  HEENT: Atraumatic. Normocephalic. PERRL. Moist membranes.  NECK: Supple, No JVD. No lymphadenopathy.  PULMONARY: CTA B/L. No wheezing. No rales  CVS: Normal S1, S2. Rate and Rhythm are regular. No murmurs.  ABDOMEN/GI: Soft, Nontender, Nondistended; BS present  MSK:  No edema B/L LE.  NEUROLOGIC:  No motor or sensory deficit.  PSYCH: Alert & oriented x 3, normal affect    Consultant(s) Notes Reviewed:  [x ] YES  [ ] NO  Care Discussed with Consultants/Other Providers [ x] YES  [ ] NO    LABS:                        12.8   9.70  )-----------( 293      ( 07 May 2023 06:35 )             38.7     05-07    141  |  103  |  5<L>  ----------------------------<  120<H>  4.0   |  25  |  0.5<L>    Ca    8.4      07 May 2023 06:35  Mg     2.1     05-07    TPro  6.5  /  Alb  3.8  /  TBili  0.9  /  DBili  x   /  AST  50<H>  /  ALT  284<H>  /  AlkPhos  136<H>  05-07            Culture - Blood (collected 05 May 2023 17:07)  Source: .Blood None  Preliminary Report (07 May 2023 01:02):    No growth to date.    Culture - Blood (collected 05 May 2023 17:07)  Source: .Blood None  Preliminary Report (07 May 2023 01:02):    No growth to date.        RADIOLOGY & ADDITIONAL TESTS:      Imaging or report Personally Reviewed:  [x] YES  [ ] NO  EKG reviewed: [x] YES  [ ] NO    Medications:  Standing  enoxaparin Injectable 40 milliGRAM(s) SubCutaneous every 24 hours  pantoprazole    Tablet 40 milliGRAM(s) Oral before breakfast    PRN Meds  ibuprofen  Tablet. 400 milliGRAM(s) Oral every 8 hours PRN  ketorolac   Injectable 15 milliGRAM(s) IV Push every 8 hours PRN

## 2023-05-07 NOTE — PROGRESS NOTE ADULT - ASSESSMENT
36 yo female, Botswanan speaking, with no previous PMH presenting for epigastric pain. Hx goes back to 3 days when pt started having nonbilious non bloody vomiting after which she developed severe epigastric pain associated with diarrhea 2 days ago. Found to have gallstone pancreatitis associated with CBD dilatation.       # Gallstone pancreatitis-improving  # CBD dilatation, choledocholithiasis   - afebrile, WBC of 11k   - Lipase >3000   - ALK Phos 191/ / / TB 3.6/ DB 3.1  - CT AP w IVC; 1.  Acute interstitial edematous pancreatitis; no evidence of pancreatic necrosis or peripancreatic collections. Cholelithiasis with intra-/extrahepatic biliary ductal dilation.   - US Cholelithiasis and gallbladder sludge, without sonographic evidence of acute cholecystitis. CBD dilatation (9 mm). Recommend MRCP to exclude obstructing stone  - Advance to low fat diet today, npo after midnight for scope   - pain control   - off Abx as no signs/symptoms of cholangitis  - Check lipid profile.   - advanced GI recs noted: watch over the weekend. ERCP/EUS on Monday. IF gets worse, may consider it sooner.  - Surgery consult for possible cholecystectomy    ALT>AST elevation noted: trending down  -Hepatitis Panel negative    Hypomagnesemia-repleted      #DVT: lovenox  #Diet: Low fat, npo after midnight  # IAT   # PPI: protonix.     Pending: ERCP/EUS on Monday

## 2023-05-07 NOTE — PROGRESS NOTE ADULT - SUBJECTIVE AND OBJECTIVE BOX
Gastroenterology progress note:     Patient is a 37y old  Female who presents with a chief complaint of Gallstone pancreatitis (06 May 2023 10:02)       Admitted on: 05-05-23    We are following the patient for pancreatitis      Interval History:  abdominal pain improved   tolerating diet        PAST MEDICAL & SURGICAL HISTORY:      MEDICATIONS  (STANDING):  enoxaparin Injectable 40 milliGRAM(s) SubCutaneous every 24 hours  pantoprazole    Tablet 40 milliGRAM(s) Oral before breakfast    MEDICATIONS  (PRN):  ibuprofen  Tablet. 400 milliGRAM(s) Oral every 8 hours PRN Mild Pain (1 - 3), Moderate Pain (4 - 6)  ketorolac   Injectable 15 milliGRAM(s) IV Push every 8 hours PRN Severe Pain (7 - 10)      Allergies  No Known Allergies      Review of Systems:   Cardiovascular:  No Chest Pain, No Palpitations  Respiratory:  No Cough, No Dyspnea  Gastrointestinal:  As described in HPI    Physical Examination:  T(C): 36.9 (05-07-23 @ 04:50), Max: 36.9 (05-07-23 @ 04:50)  HR: 91 (05-07-23 @ 04:50) (91 - 101)  BP: 127/73 (05-07-23 @ 04:50) (127/73 - 157/89)  RR: 18 (05-07-23 @ 04:50) (18 - 18)  SpO2: --      05-07-23 @ 07:01  -  05-07-23 @ 12:00  --------------------------------------------------------  IN: 120 mL / OUT: 0 mL / NET: 120 mL      Constitutional: No acute distress.  Respiratory:  No signs of respiratory distress. Lung sounds are clear bilaterally.  Cardiovascular:  S1 S2, Regular rate and rhythm.  Abdominal: Abdomen is soft, symmetric, and non-tender without distention.         Data:                        12.8   9.70  )-----------( 293      ( 07 May 2023 06:35 )             38.7     Hgb trend:  12.8  05-07-23 @ 06:35  12.2  05-06-23 @ 07:38  14.8  05-05-23 @ 10:20        05-07    141  |  103  |  5<L>  ----------------------------<  120<H>  4.0   |  25  |  0.5<L>    Ca    8.4      07 May 2023 06:35  Mg     2.1     05-07    TPro  6.5  /  Alb  3.8  /  TBili  0.9  /  DBili  x   /  AST  50<H>  /  ALT  284<H>  /  AlkPhos  136<H>  05-07    Liver panel trend:  TBili 0.9   /   AST 50   /      /   AlkP 136   /   Tptn 6.5   /   Alb 3.8    /   DBili --      05-07  TBili 1.3   /   AST 98   /      /   AlkP 146   /   Tptn 6.2   /   Alb 3.6    /   DBili --      05-06  TBili 3.6   /      /      /   AlkP 191   /   Tptn 7.8   /   Alb 4.6    /   DBili 3.1      05-05          Culture - Blood (collected 05 May 2023 17:07)  Source: .Blood None  Preliminary Report (07 May 2023 01:02):    No growth to date.    Culture - Blood (collected 05 May 2023 17:07)  Source: .Blood None  Preliminary Report (07 May 2023 01:02):    No growth to date.         Radiology:

## 2023-05-07 NOTE — PROGRESS NOTE ADULT - ASSESSMENT
36 y/o female, Emirati speaking, with no previous PMHx presenting for epigastric pain admitted for pancreatitis. [facility] was consulted for pancreatitis and elevated LFT    #)Epigastric pain/Gallstone Pancreatitis  #)Acute interstitial pancreatitis (secondary to  gall stones) improving   #)Elevated LFT mixed pattern with predominantly hepatocellular with hyperbilirubinemia r/o choledocholithiasis   -drinks wine occasional not every day   -Hemodynamically stable/ No signs of sepsis  -US showed +GS, CBD 9mm  -CT scan showed CBD 1.3cm, hepatic steatosis, Diffuse peripancreatic fat stranding.  -Lipase>3000  -normal calcium, no new medications   -LFT at the time of admission 3.6/191/264/679    Today:   Abdominal pain improved   LFT trending down 0.9/136/50/284    Rec:  c/w low fat diet   check lipid profile   taper IVF   Monitor daily HCT/BUN/CR/urine output.  Monitor pain scale everyday and give pain medications accordingly.  Avoid alcohol and pancreatitis causing medications.  keep NPO after midnight today,   will consider EUS+/-  ERCP tomorrow based on patient clinical picture and LFTs  If concern for cholangitis or Biliary obstruction need emergent ERCP  surgery evaluation for cholecystectomy  36 y/o female, Montserratian speaking, with no previous PMHx presenting for epigastric pain admitted for pancreatitis. [facility] was consulted for pancreatitis and elevated LFT    #)Epigastric pain/Gallstone Pancreatitis  #)Acute interstitial pancreatitis (secondary to  gall stones) improving   #)Elevated LFT mixed pattern with predominantly hepatocellular with hyperbilirubinemia likely due to passed stone r/o choledocholithiasis   -drinks wine occasional not every day   -Hemodynamically stable/ No signs of sepsis  -US showed +GS, CBD 9mm  -CT scan showed CBD 1.3cm, hepatic steatosis, Diffuse peripancreatic fat stranding.  -Lipase>3000  -normal calcium, no new medications   -LFT at the time of admission 3.6/191/264/679    Today:   Abdominal pain improved   LFT trending down 0.9/136/50/284 likely passed stone  tolerating diet   Acute hepatitis panel negative     Rec:  c/w low fat diet   check lipid profile   taper IVF   Monitor daily HCT/BUN/CR/urine output.  Monitor pain scale everyday and give pain medications accordingly.  Avoid alcohol and pancreatitis causing medications.  keep NPO after midnight today,   will consider EUS+/-  ERCP tomorrow based on patient clinical picture and LFTs  If concern for cholangitis or Biliary obstruction need emergent ERCP  surgery evaluation for cholecystectomy  36 y/o female, Tunisian speaking, with no previous PMHx presenting for epigastric pain admitted for pancreatitis. [facility] was consulted for pancreatitis and elevated LFT    #)Epigastric pain/Gallstone Pancreatitis  #)Acute interstitial pancreatitis (secondary to  gall stones) improving   #)Elevated LFT mixed pattern with predominantly hepatocellular with hyperbilirubinemia likely due to passed stone r/o choledocholithiasis   -drinks wine occasional not every day   -Hemodynamically stable/ No signs of sepsis  -US showed +GS, CBD 9mm  -CT scan showed CBD 1.3cm, hepatic steatosis, Diffuse peripancreatic fat stranding.  -Lipase>3000  -normal calcium, no new medications   -LFT at the time of admission 3.6/191/264/679    Today:   Abdominal pain improved   LFT trending down 0.9/136/50/284 likely passed stone  tolerating diet   Acute hepatitis panel negative       Rec:  c/w low fat diet   taper IVF   Monitor daily HCT/BUN/CR/urine output.  Monitor pain scale everyday and give pain medications accordingly.  Avoid alcohol and pancreatitis causing medications.  keep NPO after midnight today,   will consider EUS+/-  ERCP tomorrow based on patient clinical picture and LFTs  If concern for cholangitis or Biliary obstruction need emergent ERCP  surgery evaluation for cholecystectomy

## 2023-05-08 ENCOUNTER — TRANSCRIPTION ENCOUNTER (OUTPATIENT)
Age: 38
End: 2023-05-08

## 2023-05-08 ENCOUNTER — RESULT REVIEW (OUTPATIENT)
Age: 38
End: 2023-05-08

## 2023-05-08 VITALS — HEIGHT: 64 IN | WEIGHT: 233.69 LBS

## 2023-05-08 PROCEDURE — 88312 SPECIAL STAINS GROUP 1: CPT | Mod: 26

## 2023-05-08 PROCEDURE — 99221 1ST HOSP IP/OBS SF/LOW 40: CPT

## 2023-05-08 PROCEDURE — 43237 ENDOSCOPIC US EXAM ESOPH: CPT

## 2023-05-08 PROCEDURE — 43239 EGD BIOPSY SINGLE/MULTIPLE: CPT | Mod: XU

## 2023-05-08 PROCEDURE — 99233 SBSQ HOSP IP/OBS HIGH 50: CPT

## 2023-05-08 PROCEDURE — 88305 TISSUE EXAM BY PATHOLOGIST: CPT | Mod: 26

## 2023-05-08 RX ADMIN — SODIUM CHLORIDE 100 MILLILITER(S): 9 INJECTION, SOLUTION INTRAVENOUS at 00:53

## 2023-05-08 NOTE — DISCHARGE NOTE PROVIDER - DISCHARGE DIET
DASH Diet/Low Sodium Diet/Low Fiber Diet DASH Diet/Low Sodium Diet/Consistent Carbohydrate Diabetic Diets/Low Fiber Diet

## 2023-05-08 NOTE — DISCHARGE NOTE NURSING/CASE MANAGEMENT/SOCIAL WORK - PATIENT PORTAL LINK FT
You can access the FollowMyHealth Patient Portal offered by [facility] by registering at the following website: [email or web address]. By joining [facility]’s FollowMyHealth portal, you will also be able to view your health information using other applications (apps) compatible with our system.

## 2023-05-08 NOTE — PROGRESS NOTE ADULT - ASSESSMENT
36 yo female, Welsh speaking, with no previous PMH presenting for epigastric pain. Hx goes back to 3 days when pt started having nonbilious non bloody vomiting after which she developed severe epigastric pain associated with diarrhea 2 days ago. Found to have gallstone pancreatitis associated with CBD dilatation.       # Gallstone pancreatitis-improving  # CBD dilatation, choledocholithiasis   - afebrile, WBC of 11k   - Lipase >3000   - ALK Phos 191/ / / TB 3.6/ DB 3.1  - CT AP w IVC; 1.  Acute interstitial edematous pancreatitis; no evidence of pancreatic necrosis or peripancreatic collections. Cholelithiasis with intra-/extrahepatic biliary ductal dilation.   - US Cholelithiasis and gallbladder sludge, without sonographic evidence of acute cholecystitis. CBD dilatation (9 mm).  - ERCP/EUS but no stones visualized and CBD was down to 7mm. Tolerating clears. Advance diet  - outpt lap zay as per surgery    Obesity  counselled    Elevated FS  outpt f/u w/ PMD to access for DM    Discharge instructions discussed and patient/ know when to seek immediate medical attention.  Patient has proper follow up.  All results discussed and patient/ aware they require further follow up/work up.  Stressed importance of proper follow up.  Medications prescribed and changes discussed.  All questions and concerns from patient and family addressed. Understanding of instructions verbalized.    Time spent in completing discharge process and coordinating care 45 minutes.    Discussed with housestaff, nursing, case mgmt,

## 2023-05-08 NOTE — DISCHARGE NOTE PROVIDER - CARE PROVIDER_API CALL
BRIAN VASQUEZ  Internal Medicine  Phone: [phone]  Fax: [phone])-  Follow Up Time: 1 week    Apoorva Vaughn)  Surgery; Surgical Critical Care  [address] [place] [address]  Phone: [phone]  Fax: [phone]  Follow Up Time: 2 weeks

## 2023-05-08 NOTE — CONSULT NOTE ADULT - SUBJECTIVE AND OBJECTIVE BOX
GENERAL SURGERY CONSULT NOTE    Patient: KATI RAYMUNDO , 37y ([birth year])Female   MRN: [id number]  Location: [address]  Visit: 05-05-23 Inpatient  Date: 05-08-23 @ 11:30    HPI:  36 yo female, Syrian speaking, with no previous PMH presenting for epigastric pain. Hx goes back to 3 days when pt started having nonbilious non bloody vomiting after which she developed severe epigastric pain associated with diarrhea 2 days ago. Pt unable to tolerate any PO intake 2/2 to pain and vomiting. Pt never had a similar episode before; however does report on/off  epigastric discomfort for which she had a RUQ US 2 weeks ago and was told she has cholelithiasis. Denies any alcohol intake. No fevers or chills at home.     In ED:   VS stable   Labs significant for:   WBC of 11k   Lipase >3000   ALK Phos 191/ / / TB 3.6/ DB 3.1  CT AP w IVC; 1.  Acute interstitial edematous pancreatitis; no evidence of pancreatic necrosis or peripancreatic collections.  2.  Cholelithiasis with intra-/extrahepatic biliary ductal dilation. Correlate with MRCP.    US Cholelithiasis and gallbladder sludge, without sonographic evidence of acute cholecystitis. CBD dilatation (9 mm). Recommend MRCP to exclude obstructing stone. (05 May 2023 15:31)    General surgery consult: Patient with Tbili 3.6 on admission, underwent EGD with EUS revealing CBD of 7 mm and no stones seen. Clinically, patient is without any tenderness on examination, pain, nausea/vomiting. Tolerating diet. Tbili now 0.9 and LFTs downtrending. General surgery consulted for cholecystectomy.    PAST MEDICAL & SURGICAL HISTORY:      Home Medications:        VITALS:  T(F): 97.9 (05-08-23 @ 10:11), Max: 99 (05-08-23 @ 08:57)  HR: 80 (05-08-23 @ 10:26) (70 - 102)  BP: 112/64 (05-08-23 @ 10:26) (111/61 - 144/80)  RR: 18 (05-08-23 @ 10:26) (14 - 19)  SpO2: 95% (05-08-23 @ 10:26) (95% - 99%)    PHYSICAL EXAM:  General: NAD, AAOx3, calm and cooperative  HEENT: NCAT, JULISSA, EOMI, Trachea ML, Neck supple  Cardiac: RRR S1, S2, no Murmurs, rubs or gallops  Respiratory: CTAB, normal respiratory effort, breath sounds equal BL, no wheeze, rhonchi or crackles  Abdomen: Soft, non-distended, non-tender    MEDICATIONS  (STANDING):  enoxaparin Injectable 40 milliGRAM(s) SubCutaneous every 24 hours  lactated ringers. 1000 milliLiter(s) (100 mL/Hr) IV Continuous <Continuous>  pantoprazole    Tablet 40 milliGRAM(s) Oral before breakfast    MEDICATIONS  (PRN):  ibuprofen  Tablet. 400 milliGRAM(s) Oral every 8 hours PRN Mild Pain (1 - 3), Moderate Pain (4 - 6)  ketorolac   Injectable 15 milliGRAM(s) IV Push every 8 hours PRN Severe Pain (7 - 10)      LAB/STUDIES:                        12.8   9.70  )-----------( 293      ( 07 May 2023 06:35 )             38.7     05-07    141  |  103  |  5<L>  ----------------------------<  120<H>  4.0   |  25  |  0.5<L>    Ca    8.4      07 May 2023 06:35  Mg     2.1     05-07    TPro  6.5  /  Alb  3.8  /  TBili  0.9  /  DBili  x   /  AST  50<H>  /  ALT  284<H>  /  AlkPhos  136<H>  05-07      LIVER FUNCTIONS - ( 07 May 2023 06:35 )  Alb: 3.8 g/dL / Pro: 6.5 g/dL / ALK PHOS: 136 U/L / ALT: 284 U/L / AST: 50 U/L / GGT: x                         Culture - Blood (collected 05 May 2023 17:07)  Source: .Blood None  Preliminary Report (07 May 2023 01:02):    No growth to date.    Culture - Blood (collected 05 May 2023 17:07)  Source: .Blood None  Preliminary Report (07 May 2023 01:02):    No growth to date.      IMAGING:      ACCESS DEVICES:  [X] Peripheral IV      ASSESSMENT:  36 yo female, Syrian speaking, with no previous PMH presenting for epigastric pain. Hx goes back to 3 days when pt started having nonbilious non bloody vomiting after which she developed severe epigastric pain associated with diarrhea 2 days ago. Pt unable to tolerate any PO intake 2/2 to pain and vomiting. Pt never had a similar episode before; however does report on/off  epigastric discomfort for which she had a RUQ US 2 weeks ago and was told she has cholelithiasis. Denies any alcohol intake. No fevers or chills at home.     PLAN:  -Inpatient vs outpatient cholecystectomy  -To be discussed with Dr. Vaughn  -    Lines/Tubes: PIV, Midline, Central Line, A-Line, Chest tubes, Ras/RENEE drains, Verma Catheter.    Above plan discussed with Attending Surgeon  ***  , patient, patient family, and Primary team  05-08-23 @ 11:30    CONSULT SPECTRA: [id number]   GENERAL SURGERY CONSULT NOTE    Patient: KATI RAYMUNDO , 37y ([birth year])Female   MRN: [id number]  Location: [address]  Visit: 05-05-23 Inpatient  Date: 05-08-23 @ 11:30    HPI:  36 yo female, Moroccan speaking, with no previous PMH presenting for epigastric pain. Hx goes back to 3 days when pt started having nonbilious non bloody vomiting after which she developed severe epigastric pain associated with diarrhea 2 days ago. Pt unable to tolerate any PO intake 2/2 to pain and vomiting. Pt never had a similar episode before; however does report on/off  epigastric discomfort for which she had a RUQ US 2 weeks ago and was told she has cholelithiasis. Denies any alcohol intake. No fevers or chills at home.     In ED:   VS stable   Labs significant for:   WBC of 11k   Lipase >3000   ALK Phos 191/ / / TB 3.6/ DB 3.1  CT AP w IVC; 1.  Acute interstitial edematous pancreatitis; no evidence of pancreatic necrosis or peripancreatic collections.  2.  Cholelithiasis with intra-/extrahepatic biliary ductal dilation. Correlate with MRCP.    US Cholelithiasis and gallbladder sludge, without sonographic evidence of acute cholecystitis. CBD dilatation (9 mm). Recommend MRCP to exclude obstructing stone. (05 May 2023 15:31)    General surgery consult: Patient with Tbili 3.6 on admission, underwent EGD with EUS revealing CBD of 7 mm and no stones seen. Clinically, patient is without any tenderness on examination, pain, nausea/vomiting. Tolerating diet. Tbili now 0.9 and LFTs downtrending. General surgery consulted for cholecystectomy.    PAST MEDICAL & SURGICAL HISTORY:      Home Medications:        VITALS:  T(F): 97.9 (05-08-23 @ 10:11), Max: 99 (05-08-23 @ 08:57)  HR: 80 (05-08-23 @ 10:26) (70 - 102)  BP: 112/64 (05-08-23 @ 10:26) (111/61 - 144/80)  RR: 18 (05-08-23 @ 10:26) (14 - 19)  SpO2: 95% (05-08-23 @ 10:26) (95% - 99%)    PHYSICAL EXAM:  General: NAD, AAOx3, calm and cooperative  HEENT: NCAT, JULISSA, EOMI, Trachea ML, Neck supple  Cardiac: RRR S1, S2, no Murmurs, rubs or gallops  Respiratory: CTAB, normal respiratory effort, breath sounds equal BL, no wheeze, rhonchi or crackles  Abdomen: Soft, non-distended, non-tender    MEDICATIONS  (STANDING):  enoxaparin Injectable 40 milliGRAM(s) SubCutaneous every 24 hours  lactated ringers. 1000 milliLiter(s) (100 mL/Hr) IV Continuous <Continuous>  pantoprazole    Tablet 40 milliGRAM(s) Oral before breakfast    MEDICATIONS  (PRN):  ibuprofen  Tablet. 400 milliGRAM(s) Oral every 8 hours PRN Mild Pain (1 - 3), Moderate Pain (4 - 6)  ketorolac   Injectable 15 milliGRAM(s) IV Push every 8 hours PRN Severe Pain (7 - 10)      LAB/STUDIES:                        12.8   9.70  )-----------( 293      ( 07 May 2023 06:35 )             38.7     05-07    141  |  103  |  5<L>  ----------------------------<  120<H>  4.0   |  25  |  0.5<L>    Ca    8.4      07 May 2023 06:35  Mg     2.1     05-07    TPro  6.5  /  Alb  3.8  /  TBili  0.9  /  DBili  x   /  AST  50<H>  /  ALT  284<H>  /  AlkPhos  136<H>  05-07      LIVER FUNCTIONS - ( 07 May 2023 06:35 )  Alb: 3.8 g/dL / Pro: 6.5 g/dL / ALK PHOS: 136 U/L / ALT: 284 U/L / AST: 50 U/L / GGT: x                         Culture - Blood (collected 05 May 2023 17:07)  Source: .Blood None  Preliminary Report (07 May 2023 01:02):    No growth to date.    Culture - Blood (collected 05 May 2023 17:07)  Source: .Blood None  Preliminary Report (07 May 2023 01:02):    No growth to date.      IMAGING:      ACCESS DEVICES:  [X] Peripheral IV

## 2023-05-08 NOTE — PROGRESS NOTE ADULT - SUBJECTIVE AND OBJECTIVE BOX
Patient is a 37y old  Female who presents with a chief complaint of Gallstone pancreatitis (08 May 2023 14:41)    INTERVAL HPI/OVERNIGHT EVENTS: Patient was examined and seen at bedside. This morning pt is resting comfortably in bed and reports no new issues or overnight events. No complaints, feels much better. No further pain. Went for ERCP/EUS but no stones visualized and CBD was down to 7mm. Tolerating clears. Wants to go home today.  ROS: Denies CP, SOB, AP, new weakness  All other systems reviewed and are within normal limits.  InitialHPI:  36 yo female, Maltese speaking, with no previous PMH presenting for epigastric pain. Hx goes back to 3 days when pt started having nonbilious non bloody vomiting after which she developed severe epigastric pain associated with diarrhea 2 days ago. Pt unable to tolerate any PO intake 2/2 to pain and vomiting. Pt never had a similar episode before; however does report on/off  epigastric discomfort for which she had a RUQ US 2 weeks ago and was told she has cholelithiasis. Denies any alcohol intake. No fevers or chills at home.     In ED:   VS stable   Labs significant for:   WBC of 11k   Lipase >3000   ALK Phos 191/ / / TB 3.6/ DB 3.1  CT AP w IVC; 1.  Acute interstitial edematous pancreatitis; no evidence of pancreatic necrosis or peripancreatic collections.  2.  Cholelithiasis with intra-/extrahepatic biliary ductal dilation. Correlate with MRCP.    US Cholelithiasis and gallbladder sludge, without sonographic evidence of acute cholecystitis. CBD dilatation (9 mm). Recommend MRCP to exclude obstructing stone. (05 May 2023 15:31)    PAST MEDICAL & SURGICAL HISTORY:      General: NAD, AAO3  HEENT:  EOMI, no LAD  CV: S1 S2  Resp: decreased breath sounds at bases  GI: NT/ND/S +BS  MS: no clubbing/cyanosis/edema, + pulses b/l  Neuro: nonfocal, +reflexes thruout    MEDICATIONS  (STANDING):  enoxaparin Injectable 40 milliGRAM(s) SubCutaneous every 24 hours  lactated ringers. 1000 milliLiter(s) (100 mL/Hr) IV Continuous <Continuous>  pantoprazole    Tablet 40 milliGRAM(s) Oral before breakfast    MEDICATIONS  (PRN):  ibuprofen  Tablet. 400 milliGRAM(s) Oral every 8 hours PRN Mild Pain (1 - 3), Moderate Pain (4 - 6)  ketorolac   Injectable 15 milliGRAM(s) IV Push every 8 hours PRN Severe Pain (7 - 10)    Vital Signs Last 24 Hrs  T(C): 35.7 (08 May 2023 14:17), Max: 37.2 (08 May 2023 08:57)  T(F): 96.2 (08 May 2023 14:17), Max: 99 (08 May 2023 08:57)  HR: 53 (08 May 2023 14:17) (53 - 102)  BP: 107/58 (08 May 2023 14:17) (107/58 - 144/80)  BP(mean): 77 (08 May 2023 14:17) (77 - 87)  RR: 18 (08 May 2023 14:17) (14 - 19)  SpO2: 95% (08 May 2023 10:26) (95% - 99%)    Parameters below as of 08 May 2023 14:17  Patient On (Oxygen Delivery Method): room air      CAPILLARY BLOOD GLUCOSE                              12.8   9.70  )-----------( 293      ( 07 May 2023 06:35 )             38.7     05-07    141  |  103  |  5<L>  ----------------------------<  120<H>  4.0   |  25  |  0.5<L>    Ca    8.4      07 May 2023 06:35  Mg     2.1     05-07    TPro  6.5  /  Alb  3.8  /  TBili  0.9  /  DBili  x   /  AST  50<H>  /  ALT  284<H>  /  AlkPhos  136<H>  05-07    LIVER FUNCTIONS - ( 07 May 2023 06:35 )  Alb: 3.8 g/dL / Pro: 6.5 g/dL / ALK PHOS: 136 U/L / ALT: 284 U/L / AST: 50 U/L / GGT: x                           Culture - Blood (collected 05 May 2023 17:07)  Source: .Blood None  Preliminary Report (07 May 2023 01:02):    No growth to date.    Culture - Blood (collected 05 May 2023 17:07)  Source: .Blood None  Preliminary Report (07 May 2023 01:02):    No growth to date.      Chart, Consultant(s) Notes Reviewed:  [x ] YES  [ ] NO  Care Discussed with Consultants/Other Providers/ Housestaff [ x] YES  [ ] NO  Radiology, labs, old available records personally reviewed.

## 2023-05-08 NOTE — DISCHARGE NOTE NURSING/CASE MANAGEMENT/SOCIAL WORK - NSDCPEFALRISK_GEN_ALL_CORE
For information on Fall & Injury Prevention, visit: [email or web address] OR  [email or web address] OR  https://www.cdc.gov/steadi/patient.html

## 2023-05-08 NOTE — DISCHARGE NOTE PROVIDER - PROVIDER TOKENS
PROVIDER:[TOKEN:[[id number]],FOLLOWUP:[1 week]],PROVIDER:[TOKEN:[[id number]],FOLLOWUP:[2 weeks]]

## 2023-05-08 NOTE — DISCHARGE NOTE NURSING/CASE MANAGEMENT/SOCIAL WORK - NURSING SECTION COMPLETE
Patient/Caregiver provided printed discharge information.
[facility]

## 2023-05-08 NOTE — CONSULT NOTE ADULT - CONSULT REQUESTED BY NAME
Med
Walking patient around ED x 3 with oxygen at % with heart rate 65.      Alessandro Carreon RN  12/07/22 [id number]
Medicine

## 2023-05-08 NOTE — CONSULT NOTE ADULT - ATTENDING COMMENTS
ACS Attending Note Attestation    Patient is examined and evaluated at the bedside with the residents/PAs. Treatment plan discussed with the team, nurses, and consulting physicians and consulting teams. Medications, radiological studies and all other relevant studies reviewed. I reviewed the resident/PA note and agreed with above assessment and plan with following additions and corrections.    KATI RAYMUNDO Patient is a 37y old  Female who presents with a chief complaint of Gallstone pancreatitis. Patient pain is significantly improved. Patient was offered laparoscopic cholecystectomy but she would like to follow as outpatient for elective cholecystectomy      Allergies  No Known Allergies  Intolerances    PAST MEDICAL & SURGICAL HISTORY:    Vital Signs Last 24 Hrs  T(C): 35.7 (08 May 2023 14:17), Max: 37.2 (08 May 2023 08:57)  T(F): 96.2 (08 May 2023 14:17), Max: 99 (08 May 2023 08:57)  HR: 53 (08 May 2023 14:17) (53 - 102)  BP: 107/58 (08 May 2023 14:17) (107/58 - 144/80)  BP(mean): 77 (08 May 2023 14:17) (77 - 87)  RR: 18 (08 May 2023 14:17) (14 - 19)  SpO2: 95% (08 May 2023 10:26) (95% - 99%)    Parameters below as of 08 May 2023 14:17  Patient On (Oxygen Delivery Method): room air                        12.8   9.70  )-----------( 293      ( 07 May 2023 06:35 )             38.7     05-07    141  |  103  |  5<L>  ----------------------------<  120<H>  4.0   |  25  |  0.5<L>    Ca    8.4      07 May 2023 06:35  Mg     2.1     05-07    TPro  6.5  /  Alb  3.8  /  TBili  0.9  /  DBili  x   /  AST  50<H>  /  ALT  284<H>  /  AlkPhos  136<H>  05-07      Diagnosis: gall stone pancreatitis     Plan:	  - follow up upon discharge with plan cholecystectomy in next 6-8 weeks  - supportive care  - GI/DVT prophylaxis  - pain management  - follow up consults  - repeat studies as needed    Apoorva Vaughn MD, FACS  Trauma/ACS/Surcical Critical care Attending

## 2023-05-08 NOTE — CHART NOTE - NSCHARTNOTEFT_GEN_A_CORE
EUS: The common bile duct was also traced from the hilum to the ampulla, and measured 7mm, no stones were noted. The major papilla was normal with no stones.    Plan:  - Clear liquid diet and advanced as tolerated.     - Monitor LFTs    - Cholecystectomy plans per surgery     - Await pathology results     - Will follow

## 2023-05-08 NOTE — PRE-ANESTHESIA EVALUATION ADULT - NSANTHOSAYNRD_GEN_A_CORE
No. RUSTY screening performed.  STOP BANG Legend: 0-2 = LOW Risk; 3-4 = INTERMEDIATE Risk; 5-8 = HIGH Risk

## 2023-05-08 NOTE — DISCHARGE NOTE PROVIDER - NSDCFUADDINST_GEN_ALL_CORE_FT
Follow-up with Dr. Vaughn's office at [phone] to schedule appointment for cholecystectomy in 3 weeks

## 2023-05-08 NOTE — CONSULT NOTE ADULT - ASSESSMENT
Patient is a 36 y/o female, German speaking, with no previous PMHx presenting for epigastric pain. She notes pain started months prior but she attributed to gas pains. She developed severe pain three days prior located in the Epigastric area with some radiation towards the right and left upper quadrant. Pain was associated with nausea and emesis along with PO intolerance. No alleviating factors at home. She denies toxic habits. Patient found to have intact Gallbladder with stones and sludge along with CBD of 9mm. On CT pancreatitis is noted without noted collection. Treat for Acute Pancreatitis. Will eventually need Cholecystectomy but prior will evaluate endoscopically .    Gallstone Pancreatitis  - Intact Gallbladder with stones and sludge  - CBD 9mm  - T miguel a on admission 3.6  - Obtain TG levels, INR and TnS with next labs set  - LR at 250ml Hr with IV pain control  - On Zosyn by Primary team , not cholangitic   - Will follow over the weekend- If concern for cholangitis or Biliary obstruction can always do ERCP sooner than Monday- but for now EUS +/- ERCP Monday   
ASSESSMENT:  36 yo female, Togolese speaking, with no previous PMH presenting for epigastric pain. Hx goes back to 3 days when pt started having nonbilious non bloody vomiting after which she developed severe epigastric pain associated with diarrhea 2 days ago. Pt unable to tolerate any PO intake 2/2 to pain and vomiting. Pt never had a similar episode before; however does report on/off  epigastric discomfort for which she had a RUQ US 2 weeks ago and was told she has cholelithiasis. Denies any alcohol intake. No fevers or chills at home.     PLAN:  -Patient can be advanced on diet as tolerated to low fat  -Please have patient call Dr. Vaughn's office at [phone] to schedule appointment for elective cholecystectomy in 3-4 weeks      Above plan discussed with Attending Surgeon Dr. Vaughn, patient, patient family, and Primary team  05-08-23 @ 11:30    CONSULT SPECTRA: [id number]

## 2023-05-08 NOTE — DISCHARGE NOTE PROVIDER - HOSPITAL COURSE
38 yo female, Danish speaking, with no previous PMH presenting for epigastric pain. Hx goes back to 3 days when pt started having nonbilious non bloody vomiting after which she developed severe epigastric pain associated with diarrhea 2 days ago. Found to have gallstone pancreatitis associated with CBD dilatation.       Admitted for Gallstone pancreatitis, CBD dilatation, choledocholithiasis. Patient was afebrile, WBC of 11k, Lipase >3000, ALK Phos 191/ / / TB 3.6/ DB 3.1, CT AP w IVC; 1.  Acute interstitial edematous pancreatitis; no evidence of pancreatic necrosis or peripancreatic collections. Cholelithiasis with intra-/extrahepatic biliary ductal dilation. US Cholelithiasis and gallbladder sludge, without sonographic evidence of acute cholecystitis. CBD dilatation (9 mm). Hepatitis Panel negative.   ALT>AST elevation trending down at discharge.     ERCP/EUS on 5/8 no cbd stone, cbd 7mm. s/p sx consult, plan for cholecystectomy in 3 weeks as OP. Patient tolerated diet well at discharge, pain resolved, LFTs downtrending, HD stable, and is being discharged home with op SX Follow up.

## 2023-05-08 NOTE — DISCHARGE NOTE PROVIDER - NSDCCPCAREPLAN_GEN_ALL_CORE_FT
PRINCIPAL DISCHARGE DIAGNOSIS  Diagnosis: Pancreatitis  Assessment and Plan of Treatment: You presented to us with abdominal pain. Lab work, imaging studies were obtained and gastroenterology and general surgery was consulted. You were diagnosed with inflamation of pancreatitis secondary to gall stones obstructing the duct. You were treated with IV fluids and underwent ERCP/EUS procedure. Your symptoms resolved at discharge and liver function tests improved. You are planned for gall bladder removal as outpatient with surgery in 3 weeks. Continue to use the medications as prescribed and follow-up with primary care physician and general surgery Dr. Vaughn's office at [phone] to schedule appointment in 3 weeks. Return to ED if symptoms return or worsen.      SECONDARY DISCHARGE DIAGNOSES  Diagnosis: Gall bladder stones  Assessment and Plan of Treatment:     Diagnosis: Dilated cbd, acquired  Assessment and Plan of Treatment:      PRINCIPAL DISCHARGE DIAGNOSIS  Diagnosis: Pancreatitis  Assessment and Plan of Treatment: You presented to us with abdominal pain. Lab work, imaging studies were obtained and gastroenterology and general surgery was consulted. You were diagnosed with inflamation of pancreatitis secondary to gall stones obstructing the duct. You were treated with IV fluids and underwent ERCP/EUS procedure. Your symptoms resolved at discharge and liver function tests improved. You are planned for gall bladder removal as outpatient with surgery in 3 weeks. Continue to use the medications as prescribed and follow-up with primary care physician and general surgery Dr. Vaughn's office at [phone] to schedule appointment in 3 weeks. Please maintain very low fat diet. Repeat liver enzymes within 1wk. Return to ED if symptoms return or worsen.      SECONDARY DISCHARGE DIAGNOSES  Diagnosis: Blood glucose elevated  Assessment and Plan of Treatment: please have your PMD check you for diabetes

## 2023-05-10 LAB — SURGICAL PATHOLOGY STUDY: SIGNIFICANT CHANGE UP

## 2023-05-11 LAB
CULTURE RESULTS: SIGNIFICANT CHANGE UP
CULTURE RESULTS: SIGNIFICANT CHANGE UP
SPECIMEN SOURCE: SIGNIFICANT CHANGE UP
SPECIMEN SOURCE: SIGNIFICANT CHANGE UP

## 2023-05-12 DIAGNOSIS — R79.89 OTHER SPECIFIED ABNORMAL FINDINGS OF BLOOD CHEMISTRY: ICD-10-CM

## 2023-05-12 DIAGNOSIS — K83.8 OTHER SPECIFIED DISEASES OF BILIARY TRACT: ICD-10-CM

## 2023-05-12 DIAGNOSIS — R73.09 OTHER ABNORMAL GLUCOSE: ICD-10-CM

## 2023-05-12 DIAGNOSIS — E66.9 OBESITY, UNSPECIFIED: ICD-10-CM

## 2023-05-12 DIAGNOSIS — K76.0 FATTY (CHANGE OF) LIVER, NOT ELSEWHERE CLASSIFIED: ICD-10-CM

## 2023-05-12 DIAGNOSIS — K29.70 GASTRITIS, UNSPECIFIED, WITHOUT BLEEDING: ICD-10-CM

## 2023-05-12 DIAGNOSIS — K85.80 OTHER ACUTE PANCREATITIS WITHOUT NECROSIS OR INFECTION: ICD-10-CM

## 2023-05-12 DIAGNOSIS — K44.9 DIAPHRAGMATIC HERNIA WITHOUT OBSTRUCTION OR GANGRENE: ICD-10-CM

## 2023-05-12 DIAGNOSIS — D72.829 ELEVATED WHITE BLOOD CELL COUNT, UNSPECIFIED: ICD-10-CM

## 2023-05-12 DIAGNOSIS — K80.20 CALCULUS OF GALLBLADDER WITHOUT CHOLECYSTITIS WITHOUT OBSTRUCTION: ICD-10-CM

## 2023-05-12 DIAGNOSIS — E83.42 HYPOMAGNESEMIA: ICD-10-CM

## 2023-05-12 DIAGNOSIS — E80.6 OTHER DISORDERS OF BILIRUBIN METABOLISM: ICD-10-CM

## 2023-05-12 DIAGNOSIS — K85.10 BILIARY ACUTE PANCREATITIS WITHOUT NECROSIS OR INFECTION: ICD-10-CM

## 2023-05-23 PROBLEM — Z00.00 ENCOUNTER FOR PREVENTIVE HEALTH EXAMINATION: Status: ACTIVE | Noted: 2023-05-23

## 2023-06-02 ENCOUNTER — APPOINTMENT (OUTPATIENT)
Dept: SURGERY | Facility: CLINIC | Age: 38
End: 2023-06-02
Payer: MEDICAID

## 2023-06-02 ENCOUNTER — APPOINTMENT (OUTPATIENT)
Dept: SURGERY | Facility: CLINIC | Age: 38
End: 2023-06-02

## 2023-06-02 VITALS
OXYGEN SATURATION: 99 % | DIASTOLIC BLOOD PRESSURE: 90 MMHG | SYSTOLIC BLOOD PRESSURE: 108 MMHG | WEIGHT: 230 LBS | HEIGHT: 63 IN | HEART RATE: 112 BPM | TEMPERATURE: 97.3 F | BODY MASS INDEX: 40.75 KG/M2

## 2023-06-02 PROCEDURE — 99213 OFFICE O/P EST LOW 20 MIN: CPT

## 2023-06-02 NOTE — ASSESSMENT
[FreeTextEntry1] : 37-year-old female with resolved gallstone pancreatitis, status post ERCP where no stones were found

## 2023-06-02 NOTE — PHYSICAL EXAM
[No Rash or Lesion] : No rash or lesion [Alert] : alert [Oriented to Person] : oriented to person [Oriented to Place] : oriented to place [Calm] : calm [de-identified] : No distress [de-identified] : Nonlabored breathing [de-identified] : Mildly tachycardic [de-identified] : Soft, Nontender, nondistended

## 2023-06-02 NOTE — PLAN
[FreeTextEntry1] : Plan for robotic cholecystectomy.  Risks and benefits of surgery including bleeding, infection, damage to surrounding structures were discussed with patient and she agreed to proceed to surgery.  We will book at the earliest convenient date, no pretesting needed.

## 2023-06-02 NOTE — HISTORY OF PRESENT ILLNESS
[de-identified] : 37-year-old female who was seen in the hospital after gallstone pancreatitis by Dr Joy.  Referred to me for elective cholecystectomy.  The patient feels better and has currently no pain.  While she was in the hospital she underwent an ERCP where they noted no stones.  She did not have any stents placed.  She is anxious to have her cholecystectomy as soon as possible.  She denies any medical problems, does not take any medications, and has never had surgery.

## 2023-06-07 ENCOUNTER — NON-APPOINTMENT (OUTPATIENT)
Age: 38
End: 2023-06-07

## 2023-06-30 ENCOUNTER — INPATIENT (INPATIENT)
Facility: HOSPITAL | Age: 38
LOS: 5 days | Discharge: HOME | DRG: 263 | End: 2023-07-06
Attending: SURGERY | Admitting: STUDENT IN AN ORGANIZED HEALTH CARE EDUCATION/TRAINING PROGRAM
Payer: MEDICAID

## 2023-06-30 VITALS
DIASTOLIC BLOOD PRESSURE: 81 MMHG | SYSTOLIC BLOOD PRESSURE: 116 MMHG | HEIGHT: 64 IN | OXYGEN SATURATION: 95 % | TEMPERATURE: 98 F | WEIGHT: 225.31 LBS | HEART RATE: 128 BPM | RESPIRATION RATE: 18 BRPM

## 2023-06-30 DIAGNOSIS — K81.9 CHOLECYSTITIS, UNSPECIFIED: ICD-10-CM

## 2023-06-30 LAB
ALBUMIN SERPL ELPH-MCNC: 4.5 G/DL — SIGNIFICANT CHANGE UP (ref 3.5–5.2)
ALP SERPL-CCNC: 199 U/L — HIGH (ref 30–115)
ALT FLD-CCNC: 639 U/L — HIGH (ref 0–41)
ANION GAP SERPL CALC-SCNC: 17 MMOL/L — HIGH (ref 7–14)
APPEARANCE UR: ABNORMAL
AST SERPL-CCNC: 220 U/L — HIGH (ref 0–41)
BACTERIA # UR AUTO: ABNORMAL
BASOPHILS # BLD AUTO: 0.02 K/UL — SIGNIFICANT CHANGE UP (ref 0–0.2)
BASOPHILS NFR BLD AUTO: 0.1 % — SIGNIFICANT CHANGE UP (ref 0–1)
BILIRUB DIRECT SERPL-MCNC: 1 MG/DL — HIGH (ref 0–0.3)
BILIRUB INDIRECT FLD-MCNC: 0.8 MG/DL — SIGNIFICANT CHANGE UP (ref 0.2–1.2)
BILIRUB SERPL-MCNC: 1.8 MG/DL — HIGH (ref 0.2–1.2)
BILIRUB UR-MCNC: ABNORMAL
BUN SERPL-MCNC: 8 MG/DL — LOW (ref 10–20)
CALCIUM SERPL-MCNC: 9.2 MG/DL — SIGNIFICANT CHANGE UP (ref 8.4–10.5)
CHLORIDE SERPL-SCNC: 95 MMOL/L — LOW (ref 98–110)
CO2 SERPL-SCNC: 22 MMOL/L — SIGNIFICANT CHANGE UP (ref 17–32)
COLOR SPEC: ABNORMAL
CREAT SERPL-MCNC: 0.6 MG/DL — LOW (ref 0.7–1.5)
DIFF PNL FLD: ABNORMAL
EGFR: 118 ML/MIN/1.73M2 — SIGNIFICANT CHANGE UP
EOSINOPHIL # BLD AUTO: 0.02 K/UL — SIGNIFICANT CHANGE UP (ref 0–0.7)
EOSINOPHIL NFR BLD AUTO: 0.1 % — SIGNIFICANT CHANGE UP (ref 0–8)
EPI CELLS # UR: 8 /HPF — HIGH (ref 0–5)
GLUCOSE SERPL-MCNC: 116 MG/DL — HIGH (ref 70–99)
GLUCOSE UR QL: NEGATIVE — SIGNIFICANT CHANGE UP
HCG SERPL QL: NEGATIVE — SIGNIFICANT CHANGE UP
HCT VFR BLD CALC: 45.5 % — SIGNIFICANT CHANGE UP (ref 37–47)
HGB BLD-MCNC: 15.6 G/DL — SIGNIFICANT CHANGE UP (ref 12–16)
HYALINE CASTS # UR AUTO: 3 /LPF — SIGNIFICANT CHANGE UP (ref 0–7)
IMM GRANULOCYTES NFR BLD AUTO: 0.4 % — HIGH (ref 0.1–0.3)
KETONES UR-MCNC: ABNORMAL
LACTATE SERPL-SCNC: 1.3 MMOL/L — SIGNIFICANT CHANGE UP (ref 0.7–2)
LEUKOCYTE ESTERASE UR-ACNC: ABNORMAL
LIDOCAIN IGE QN: >3000 U/L — HIGH (ref 7–60)
LYMPHOCYTES # BLD AUTO: 1.94 K/UL — SIGNIFICANT CHANGE UP (ref 1.2–3.4)
LYMPHOCYTES # BLD AUTO: 13.1 % — LOW (ref 20.5–51.1)
MCHC RBC-ENTMCNC: 30.8 PG — SIGNIFICANT CHANGE UP (ref 27–31)
MCHC RBC-ENTMCNC: 34.3 G/DL — SIGNIFICANT CHANGE UP (ref 32–37)
MCV RBC AUTO: 89.9 FL — SIGNIFICANT CHANGE UP (ref 81–99)
MONOCYTES # BLD AUTO: 1.01 K/UL — HIGH (ref 0.1–0.6)
MONOCYTES NFR BLD AUTO: 6.8 % — SIGNIFICANT CHANGE UP (ref 1.7–9.3)
NEUTROPHILS # BLD AUTO: 11.72 K/UL — HIGH (ref 1.4–6.5)
NEUTROPHILS NFR BLD AUTO: 79.5 % — HIGH (ref 42.2–75.2)
NITRITE UR-MCNC: POSITIVE
NRBC # BLD: 0 /100 WBCS — SIGNIFICANT CHANGE UP (ref 0–0)
PH UR: 6 — SIGNIFICANT CHANGE UP (ref 5–8)
PLATELET # BLD AUTO: 341 K/UL — SIGNIFICANT CHANGE UP (ref 130–400)
PMV BLD: 10.4 FL — SIGNIFICANT CHANGE UP (ref 7.4–10.4)
POTASSIUM SERPL-MCNC: 3.7 MMOL/L — SIGNIFICANT CHANGE UP (ref 3.5–5)
POTASSIUM SERPL-SCNC: 3.7 MMOL/L — SIGNIFICANT CHANGE UP (ref 3.5–5)
PROT SERPL-MCNC: 7.7 G/DL — SIGNIFICANT CHANGE UP (ref 6–8)
PROT UR-MCNC: ABNORMAL
RBC # BLD: 5.06 M/UL — SIGNIFICANT CHANGE UP (ref 4.2–5.4)
RBC # FLD: 13.2 % — SIGNIFICANT CHANGE UP (ref 11.5–14.5)
RBC CASTS # UR COMP ASSIST: 4 /HPF — SIGNIFICANT CHANGE UP (ref 0–4)
SODIUM SERPL-SCNC: 134 MMOL/L — LOW (ref 135–146)
SP GR SPEC: 1.03 — SIGNIFICANT CHANGE UP (ref 1.01–1.03)
TRIGL SERPL-MCNC: 148 MG/DL — SIGNIFICANT CHANGE UP
TROPONIN T SERPL-MCNC: <0.01 NG/ML — SIGNIFICANT CHANGE UP
UROBILINOGEN FLD QL: SIGNIFICANT CHANGE UP
WBC # BLD: 14.77 K/UL — HIGH (ref 4.8–10.8)
WBC # FLD AUTO: 14.77 K/UL — HIGH (ref 4.8–10.8)
WBC UR QL: 61 /HPF — HIGH (ref 0–5)

## 2023-06-30 PROCEDURE — 36415 COLL VENOUS BLD VENIPUNCTURE: CPT

## 2023-06-30 PROCEDURE — 83036 HEMOGLOBIN GLYCOSYLATED A1C: CPT

## 2023-06-30 PROCEDURE — C1889: CPT

## 2023-06-30 PROCEDURE — 82247 BILIRUBIN TOTAL: CPT

## 2023-06-30 PROCEDURE — 85610 PROTHROMBIN TIME: CPT

## 2023-06-30 PROCEDURE — 87040 BLOOD CULTURE FOR BACTERIA: CPT

## 2023-06-30 PROCEDURE — 82330 ASSAY OF CALCIUM: CPT

## 2023-06-30 PROCEDURE — 86900 BLOOD TYPING SEROLOGIC ABO: CPT

## 2023-06-30 PROCEDURE — 83690 ASSAY OF LIPASE: CPT

## 2023-06-30 PROCEDURE — 71045 X-RAY EXAM CHEST 1 VIEW: CPT | Mod: 26

## 2023-06-30 PROCEDURE — 86140 C-REACTIVE PROTEIN: CPT

## 2023-06-30 PROCEDURE — 99222 1ST HOSP IP/OBS MODERATE 55: CPT

## 2023-06-30 PROCEDURE — 93005 ELECTROCARDIOGRAM TRACING: CPT

## 2023-06-30 PROCEDURE — 80061 LIPID PANEL: CPT

## 2023-06-30 PROCEDURE — 80076 HEPATIC FUNCTION PANEL: CPT

## 2023-06-30 PROCEDURE — 82248 BILIRUBIN DIRECT: CPT

## 2023-06-30 PROCEDURE — 88304 TISSUE EXAM BY PATHOLOGIST: CPT

## 2023-06-30 PROCEDURE — 83735 ASSAY OF MAGNESIUM: CPT

## 2023-06-30 PROCEDURE — 84478 ASSAY OF TRIGLYCERIDES: CPT

## 2023-06-30 PROCEDURE — 99285 EMERGENCY DEPT VISIT HI MDM: CPT

## 2023-06-30 PROCEDURE — 85027 COMPLETE CBC AUTOMATED: CPT

## 2023-06-30 PROCEDURE — 83605 ASSAY OF LACTIC ACID: CPT

## 2023-06-30 PROCEDURE — 80048 BASIC METABOLIC PNL TOTAL CA: CPT

## 2023-06-30 PROCEDURE — 85025 COMPLETE CBC W/AUTO DIFF WBC: CPT

## 2023-06-30 PROCEDURE — A9579: CPT

## 2023-06-30 PROCEDURE — 76705 ECHO EXAM OF ABDOMEN: CPT | Mod: 26

## 2023-06-30 PROCEDURE — 86901 BLOOD TYPING SEROLOGIC RH(D): CPT

## 2023-06-30 PROCEDURE — 85730 THROMBOPLASTIN TIME PARTIAL: CPT

## 2023-06-30 PROCEDURE — 74177 CT ABD & PELVIS W/CONTRAST: CPT | Mod: 26,MA

## 2023-06-30 PROCEDURE — 84100 ASSAY OF PHOSPHORUS: CPT

## 2023-06-30 PROCEDURE — 86850 RBC ANTIBODY SCREEN: CPT

## 2023-06-30 PROCEDURE — 74183 MRI ABD W/O CNTR FLWD CNTR: CPT

## 2023-06-30 PROCEDURE — 80053 COMPREHEN METABOLIC PANEL: CPT

## 2023-06-30 RX ORDER — CHLORHEXIDINE GLUCONATE 213 G/1000ML
1 SOLUTION TOPICAL
Refills: 0 | Status: DISCONTINUED | OUTPATIENT
Start: 2023-06-30 | End: 2023-07-02

## 2023-06-30 RX ORDER — SODIUM CHLORIDE 9 MG/ML
1000 INJECTION INTRAMUSCULAR; INTRAVENOUS; SUBCUTANEOUS ONCE
Refills: 0 | Status: COMPLETED | OUTPATIENT
Start: 2023-06-30 | End: 2023-06-30

## 2023-06-30 RX ORDER — ONDANSETRON 8 MG/1
4 TABLET, FILM COATED ORAL EVERY 8 HOURS
Refills: 0 | Status: DISCONTINUED | OUTPATIENT
Start: 2023-06-30 | End: 2023-07-01

## 2023-06-30 RX ORDER — KETOROLAC TROMETHAMINE 30 MG/ML
15 SYRINGE (ML) INJECTION ONCE
Refills: 0 | Status: DISCONTINUED | OUTPATIENT
Start: 2023-06-30 | End: 2023-06-30

## 2023-06-30 RX ORDER — CEFTRIAXONE 500 MG/1
2000 INJECTION, POWDER, FOR SOLUTION INTRAMUSCULAR; INTRAVENOUS EVERY 24 HOURS
Refills: 0 | Status: DISCONTINUED | OUTPATIENT
Start: 2023-06-30 | End: 2023-07-05

## 2023-06-30 RX ORDER — METRONIDAZOLE 500 MG
500 TABLET ORAL ONCE
Refills: 0 | Status: COMPLETED | OUTPATIENT
Start: 2023-06-30 | End: 2023-06-30

## 2023-06-30 RX ORDER — SODIUM CHLORIDE 9 MG/ML
1000 INJECTION, SOLUTION INTRAVENOUS
Refills: 0 | Status: DISCONTINUED | OUTPATIENT
Start: 2023-06-30 | End: 2023-07-02

## 2023-06-30 RX ORDER — METRONIDAZOLE 500 MG
500 TABLET ORAL EVERY 8 HOURS
Refills: 0 | Status: DISCONTINUED | OUTPATIENT
Start: 2023-06-30 | End: 2023-07-05

## 2023-06-30 RX ORDER — SODIUM CHLORIDE 9 MG/ML
1000 INJECTION, SOLUTION INTRAVENOUS ONCE
Refills: 0 | Status: COMPLETED | OUTPATIENT
Start: 2023-06-30 | End: 2023-06-30

## 2023-06-30 RX ORDER — CEFTRIAXONE 500 MG/1
1000 INJECTION, POWDER, FOR SOLUTION INTRAMUSCULAR; INTRAVENOUS ONCE
Refills: 0 | Status: COMPLETED | OUTPATIENT
Start: 2023-06-30 | End: 2023-06-30

## 2023-06-30 RX ORDER — KETOROLAC TROMETHAMINE 30 MG/ML
30 SYRINGE (ML) INJECTION EVERY 6 HOURS
Refills: 0 | Status: DISCONTINUED | OUTPATIENT
Start: 2023-06-30 | End: 2023-07-05

## 2023-06-30 RX ORDER — ENOXAPARIN SODIUM 100 MG/ML
40 INJECTION SUBCUTANEOUS EVERY 24 HOURS
Refills: 0 | Status: DISCONTINUED | OUTPATIENT
Start: 2023-06-30 | End: 2023-07-04

## 2023-06-30 RX ADMIN — Medication 100 MILLIGRAM(S): at 19:47

## 2023-06-30 RX ADMIN — CEFTRIAXONE 100 MILLIGRAM(S): 500 INJECTION, POWDER, FOR SOLUTION INTRAMUSCULAR; INTRAVENOUS at 15:30

## 2023-06-30 RX ADMIN — SODIUM CHLORIDE 1000 MILLILITER(S): 9 INJECTION INTRAMUSCULAR; INTRAVENOUS; SUBCUTANEOUS at 14:21

## 2023-06-30 RX ADMIN — Medication 15 MILLIGRAM(S): at 15:30

## 2023-06-30 RX ADMIN — SODIUM CHLORIDE 1000 MILLILITER(S): 9 INJECTION, SOLUTION INTRAVENOUS at 19:46

## 2023-06-30 NOTE — ED ADULT NURSE NOTE - HOW OFTEN DO YOU HAVE SIX OR MORE DRINKS ON ONE OCCASION?
General:     NAD, well-nourished, well-appearing  Head:     NC/AT, EOMI, oral mucosa moist  Neck:     supple  Lungs:     CTA b/l, no w/r/r  CVS:     S1S2, RRR, no m/g/r  Abd:     +BS, s/nt/nd, no organomegaly  Ext:    2+ radial and pedal pulses, no c/c/e  Neuro: grossly intact
Never

## 2023-06-30 NOTE — H&P ADULT - ATTENDING COMMENTS
ASSESSMENT  Gallstone Pancreatitis  Asymptomatic Bacteriuria          Plan & assessment changed as needed.  I agree with the resident note, with the exceptions listed in my attestation above.  The remainder of impression and plan per resident note.

## 2023-06-30 NOTE — ED PROVIDER NOTE - OBJECTIVE STATEMENT
37 year-old Female with past medical history of pancreatitis and gallstones presents with complaint of abdominal pain.  Patient states abdominal pain has been present for 3 days, has been associated with 4 episodes of nonbilious nonbloody vomiting yesterday and 4 episodes today.  States pain occasionally radiates to the back between the shoulder blades.  Patient states pain is worse with meals, comes and goes.  Denies fever/chills, chest pain, shortness of breath, diarrhea, lightheadedness/weakness, numbness/tingling.

## 2023-06-30 NOTE — ED PROVIDER NOTE - ATTENDING APP SHARED VISIT CONTRIBUTION OF CARE
38 yo F pmh of known gallstones and previous episode of pancreatitis presents with abdominal pain. pain started 3 days ago, epigastric. Was in her back but now worse in the front. + N/v 2 days ago since resolved. no fevers or chills. no hx of surgeries in the past. States that this feels like her previous episode of pancreatitis.     CONSTITUTIONAL: Well-developed; well-nourished; in no acute distress.   SKIN: warm, dry  HEAD: Normocephalic; atraumatic.  EYES: PERRL, EOMI, no conjunctival erythema  ENT: No nasal discharge; airway clear.  NECK: Supple; non tender.  CARD: S1, S2 normal;  Regular rate and rhythm.   RESP: No wheezes, rales or rhonchi.  ABD: soft + epigastric tenderness, no cva tenderness, non distended, no rebound or guarding  EXT: Normal ROM.  5/5 strength in all 4 extremities   LYMPH: No acute cervical adenopathy.  NEURO: Alert, oriented, grossly unremarkable. neurovascularly intact  PSYCH: Cooperative, appropriate.

## 2023-06-30 NOTE — H&P ADULT - NSHPPHYSICALEXAM_GEN_ALL_CORE
GENERAL: NAD, speaks in full sentences, no signs of respiratory distress  HEAD:  Atraumatic, Normocephalic  EYES: EOMI, PERRLA, anicteric sclera  NECK: Supple, No JVD  CHEST/LUNG: Clear to auscultation bilaterally; No wheeze; No crackles; No accessory muscles used  HEART: Regular rate and rhythm; No murmurs;   ABDOMEN: Soft, Nontender, Nondistended; Bowel sounds present; No guarding  EXTREMITIES:  2+ Peripheral Pulses, No cyanosis or edema  PSYCH: AAOx3  NEUROLOGY: non-focal  SKIN: No rashes or lesions GENERAL: NAD, speaks in full sentences, no signs of respiratory distress  HEAD:  Atraumatic, Normocephalic  EYES: EOMI, PERRLA, anicteric sclera  NECK: Supple, No JVD  CHEST/LUNG: Clear to auscultation bilaterally; No wheeze; No crackles; No accessory muscles used  HEART: Regular rate and rhythm; No murmurs;   ABDOMEN: Soft, epigastric and left upper quadrant tenderness, Nondistended; Bowel sounds present; No guarding  EXTREMITIES:  2+ Peripheral Pulses, No cyanosis or edema  PSYCH: AAOx3  NEUROLOGY: non-focal  SKIN: No rashes or lesions

## 2023-06-30 NOTE — H&P ADULT - NSHPLABSRESULTS_GEN_ALL_CORE
LABS:                        15.6   14.77 )-----------( 341      ( 2023 14:25 )             45.5     Hb Trend: 15.6<--  WBC Trend: 14.77<--  Plt Trend: 341<--              134<L>  |  95<L>  |  8<L>  ----------------------------<  116<H>  3.7   |  22  |  0.6<L>    Ca    9.2      2023 14:25    TPro  7.7  /  Alb  4.5  /  TBili  1.8<H>  /  DBili  1.0<H>  /  AST  220<H>  /  ALT  639<H>  /  AlkPhos  199<H>      Troponin T, Serum: <0.01 ng/mL (23 @ 14:25)      Urinalysis Basic - ( 2023 14:25 )    Color: Ana / Appearance: Slightly Turbid / S.026 / pH: x  Gluc: 116 mg/dL / Ketone: Large  / Bili: Small / Urobili: <2 mg/dL   Blood: x / Protein: 30 mg/dL / Nitrite: Positive   Leuk Esterase: Large / RBC: 4 /HPF / WBC 61 /HPF   Sq Epi: x / Non Sq Epi: x / Bacteria: Moderate      CAPILLARY BLOOD GLUCOSE              IMAGING:  reviewed LABS:                        15.6   14.77 )-----------( 341      ( 2023 14:25 )             45.5     Hb Trend: 15.6<--  WBC Trend: 14.77<--  Plt Trend: 341<--              134<L>  |  95<L>  |  8<L>  ----------------------------<  116<H>  3.7   |  22  |  0.6<L>    Ca    9.2      2023 14:25    TPro  7.7  /  Alb  4.5  /  TBili  1.8<H>  /  DBili  1.0<H>  /  AST  220<H>  /  ALT  639<H>  /  AlkPhos  199<H>      Troponin T, Serum: <0.01 ng/mL (23 @ 14:25)      Urinalysis Basic - ( 2023 14:25 )    Color: Ana / Appearance: Slightly Turbid / S.026 / pH: x  Gluc: 116 mg/dL / Ketone: Large  / Bili: Small / Urobili: <2 mg/dL   Blood: x / Protein: 30 mg/dL / Nitrite: Positive   Leuk Esterase: Large / RBC: 4 /HPF / WBC 61 /HPF   Sq Epi: x / Non Sq Epi: x / Bacteria: Moderate      IMAGING:  reviewed

## 2023-06-30 NOTE — ED PROVIDER NOTE - CLINICAL SUMMARY MEDICAL DECISION MAKING FREE TEXT BOX
Patient presents with abdominal pain. labs, ct, sono done. + choledoclithiasis with elevated LFTs and lipase. GI consulted who recommends fluids and NPO. Surgery consulted. Patient remains tachy. Admitted to ICU for further management.

## 2023-06-30 NOTE — ED PROVIDER NOTE - PROGRESS NOTE DETAILS
AY: Patient made aware of CT result, given opportunity to ask questions + endorsed understanding.  Consulted GI and surgery. Stated will evaluate patient.

## 2023-06-30 NOTE — CONSULT NOTE ADULT - ASSESSMENT
37F with PMH of pancreatitis and gallstones presents with complaint of abdominal pain.  Patient states abdominal pain has been present for 3 days. She describes the pain as sharp localized to the epigastric region. She endorses that the pain occasionally radiates to the back between the shoulder blades.  She states that the pain has been associated with 4 episodes of nonbilious nonbloody vomiting yesterday and 4 episodes today.  Patient states pain is worse with meals, comes and goes.  Denies fever/chills, chest pain, shortness of breath, diarrhea, lightheadedness/weakness, numbness/tingling. States that this feels like her previous episode of pancreatitis. General surgery consulted for management of recurrent gallstone pancreatitis.    PLAN:  #Gallstone Pancreatitis   - NPO w IVF   - Serial abdominal exams   - Advanced GI consult for possible EUS/ERCP   - Patient will likely benefit from interval cholecystectomy after resolution of pancreatitis   - Surgery following    Discussed plan with attending surgeon on call, Dr. Gimenez  SPECTRA [id number]

## 2023-06-30 NOTE — H&P ADULT - ASSESSMENT
37 year-old Female with past medical history of pancreatitis and gallstones presents with complaint of abdominal pain.  Patient states abdominal pain has been present for 3 days. She describes the pain as sharp localized to the epigastric region. She endorses that the pain occasionally radiates to the back between the shoulder blades.  She states that the pain has been associated with 4 episodes of nonbilious nonbloody vomiting yesterday and 4 episodes today.  Patient states pain is worse with meals, comes and goes.  Denies fever/chills, chest pain, shortness of breath, diarrhea, lightheadedness/weakness, numbness/tingling. States that this feels like her previous episode of pancreatitis.    Of note patient was hospitalized from 05/05/23 to 05/08/2023, for similar symptoms and was diagnosed with Gallstone Pancreatitis. She had ERCP/EUS on 5/8 which showed no cbd stone, cbd 7mm and was discharged with plan for cholecystectomy as OP.    In ED:   VS were /81, . RR 18, T 97.8, SPO2 95% on RA.   Labs were  significant for:   WBC of 14k , AG 17  UA positive for large ketones, nitrites, LE and bacteria  Lipase >3000   ALK Phos 199/ / / TB 1.8/ DB 1  EKG showed Sinus tachycardia   CXR unremarkable  CT AP showed1.  Peripancreatic fat stranding without discrete fluid collection, suggesting mild pancreatitis. 2.  there are inflammatory changes surrounding the mid descending colon   with a fat-containing appendage contiguous to it consistent with epiploic appendicitis    US showed Echogenic liver compatible with hepatic steatosis. Cholelithiasis and biliary sludge. Mild wall thickening and surrounding inflammatory changes. Findings suggesting acute cholecystitis.  Patient received ceftriaxone and flagyl x1, 1L LR bolus     Patient is being admitted to the ICU for gallstone pancreatitis     ASSESSMENT  Gallstone Pancreatitis  Acute Cystitis   Acute Cholecystitis       PLAN    CNS: Avoid CNS Depressants.     HEENT: Oral care. Aspiration precautions     PULMONARY: HOB @ 45. Monitor Pulse Ox. Keep > 93%. Supplement as needed.    CARDIOVASCULAR: Agressive hydration with NS, MAP adequate. Avoid Overload     GI: GI prophylaxis.  # Gallstone pancreatitis  - afebrile, WBC of 11k   - Lipase >3000   - ALK Phos 191/ / / TB 3.6/ DB 3.1  - CT AP w IVC;  Peripancreatic fat stranding without discrete fluid collection, suggesting mild pancreatitis. 2.  there are inflammatory changes surrounding the mid descending colon   with a fat-containing appendage contiguous to it consistent with epiploic appendicitis  - US showed Echogenic liver compatible with hepatic steatosis. Cholelithiasis and biliary sludge. Mild wall thickening and surrounding inflammatory changes. Findings suggesting acute cholecystitis.  - NPO, advance as tolerated to Low fat, dairy free regular diet, resume feeds once N/V controlled   - IVF: LR @ 250 cc/hr   - pain management: toradol 30 q6h PRN  -continue Zofran for Nausea  -Calcium   -f/u CRP on admission and trend   -Monitor LFTs   -HCT  |  BUN    on admission, Monitor BUN and HCT  - Target BUN <20 and HCT <44   -Monitor urine output daily - Target Urine output: 0.5cc/kg/hr and adjust fluids accordingly  -Please obtain CTAP w/ IV contrast if clinical deterioration in 48-72 hours  - GI eval and Surgery eval     RENAL: Avoid nephrotoxic agents Replete electrolytes, f/u rpt CMP, manrique     INFECTIOUS DISEASE: Abx,  f/u BCx/UA/UCx, f/u MRSA, Procal, trend lactate     HEMATOLOGICAL: DVT prophylaxis, Monitor Hb, no acute signs of bleeding    ENDOCRINE: Keep FS <180    MUSCULOSKELETAL: Bedrest     CODE STATUS: Full     DISPOSITION: MICU         37 year-old Female with past medical history of pancreatitis and gallstones presents with complaint of abdominal pain.  Found to have gallstone pancreatitis.      ASSESSMENT  Gallstone Pancreatitis  Asymptomatic Bacteriuria        PLAN    CNS: Avoid CNS Depressants.     HEENT: Oral care. Aspiration precautions     PULMONARY: HOB @ 45. Monitor Pulse Ox. Keep > 93%. Supplement as needed.    CARDIOVASCULAR: Aggressive hydration with LR, MAP adequate. Avoid Overload     GI: GI prophylaxis.  - afebrile, WBC of 11k   - Lipase >3000   - ALK Phos 191/ / / TB 3.6/ DB 3.1  - CT AP w IVC;  Peripancreatic fat stranding without discrete fluid collection, suggesting mild pancreatitis. 2.  there are inflammatory changes surrounding the mid descending colon   with a fat-containing appendage contiguous to it consistent with epiploic appendicitis  - US showed Echogenic liver compatible with hepatic steatosis. Cholelithiasis and biliary sludge. Mild wall thickening and surrounding inflammatory changes. Findings suggesting acute cholecystitis.  - NPO, advance as tolerated to Low fat, dairy free regular diet, resume feeds once N/V controlled   - IVF: LR @ 250 cc/hr   - pain management: toradol 30 q6h PRN  - continue Zofran for Nausea  - f/u CRP on admission and trend , calcium  - Monitor LFTs   - Monitor urine output daily - Target Urine output: 0.5cc/kg/hr and adjust fluids accordingly  - Please obtain CTAP w/ IV contrast if clinical deterioration in 48-72 hours  - Advance GI eval and Surgery eval     RENAL: Avoid nephrotoxic agents Replete electrolytes, f/u rpt CMP, manrique     INFECTIOUS DISEASE: Start Ceftriaxone and Flagyl,  f/u BCx//UCx,    HEMATOLOGICAL: DVT prophylaxis, Monitor Hb, no acute signs of bleeding    ENDOCRINE: Keep FS <180    MUSCULOSKELETAL: Increase as tolerated    CODE STATUS: Full     DISPOSITION: MICU         37 year-old Female with past medical history of pancreatitis and gallstones presents with complaint of abdominal pain.  Found to have gallstone pancreatitis.      ASSESSMENT  Gallstone Pancreatitis  Asymptomatic Bacteriuria        PLAN    CNS: Avoid CNS Depressants.     HEENT: Oral care. Aspiration precautions     PULMONARY: HOB @ 45. Monitor Pulse Ox. Keep > 93%. Supplement as needed.    CARDIOVASCULAR: Aggressive hydration with LR, MAP adequate. Avoid Overload     GI: GI prophylaxis.  - afebrile, WBC of 14k   - Lipase >3000   - ALK Phos 199/ / / TB 1.8/ DB 1  - CT AP w IVC;  Peripancreatic fat stranding without discrete fluid collection, suggesting mild pancreatitis. 2.  there are inflammatory changes surrounding the mid descending colon   with a fat-containing appendage contiguous to it consistent with epiploic appendicitis  - US showed Echogenic liver compatible with hepatic steatosis. Cholelithiasis and biliary sludge. Mild wall thickening and surrounding inflammatory changes. Findings suggesting acute cholecystitis.  - NPO, advance as tolerated to Low fat, dairy free regular diet, resume feeds once N/V controlled   - IVF: LR @ 250 cc/hr   - pain management: toradol 30 q6h PRN  - continue Zofran for Nausea  - f/u CRP on admission and trend ,  - Monitor LFTs   - Monitor urine output daily - Target Urine output: 0.5cc/kg/hr and adjust fluids accordingly  - Please obtain CTAP w/ IV contrast if clinical deterioration in 48-72 hours  - Advance GI eval and Surgery eval     RENAL: Avoid nephrotoxic agents Replete electrolytes, f/u rpt CMP, manrique     INFECTIOUS DISEASE: Start Ceftriaxone and Flagyl,  f/u BCx//UCx,    HEMATOLOGICAL: DVT prophylaxis, Monitor Hb, no acute signs of bleeding    ENDOCRINE: Keep FS <180    MUSCULOSKELETAL: Increase as tolerated    CODE STATUS: Full     DISPOSITION: MICU         37 year-old Female with past medical history of pancreatitis and gallstones presents with complaint of abdominal pain.  Found to have gallstone pancreatitis.      ASSESSMENT  Gallstone Pancreatitis  Asymptomatic Bacteriuria    Appendicitis?    PLAN    CNS: Avoid CNS Depressants.     HEENT: Oral care. Aspiration precautions     PULMONARY: HOB @ 45. Monitor Pulse Ox. Keep > 93%. Supplement as needed.    CARDIOVASCULAR: Aggressive hydration with LR, MAP adequate. Avoid Overload     GI: GI prophylaxis.  - afebrile, WBC of 14k   - Lipase >3000   - ALK Phos 199/ / / TB 1.8/ DB 1  - CT AP w IVC;  Peripancreatic fat stranding without discrete fluid collection, suggesting mild pancreatitis. 2.  there are inflammatory changes surrounding the mid descending colon   with a fat-containing appendage contiguous to it consistent with epiploic appendicitis  - US showed Echogenic liver compatible with hepatic steatosis. Cholelithiasis and biliary sludge. Mild wall thickening and surrounding inflammatory changes. Findings suggesting acute cholecystitis.  - NPO, advance as tolerated to Low fat, dairy free regular diet, resume feeds once N/V controlled   - IVF: LR @ 250 cc/hr   - pain management: toradol 30 q6h PRN  - continue Zofran for Nausea  - f/u CRP on admission and trend ,  - Monitor LFTs   - Monitor urine output daily - Target Urine output: 0.5cc/kg/hr and adjust fluids accordingly  - Please obtain CTAP w/ IV contrast if clinical deterioration in 48-72 hours  - Advance GI eval and Surgery eval     RENAL: Avoid nephrotoxic agents Replete electrolytes, f/u rpt CMP, manrique     INFECTIOUS DISEASE: Start Ceftriaxone and Flagyl,  f/u BCx//UCx,    HEMATOLOGICAL: DVT prophylaxis, Monitor Hb, no acute signs of bleeding    ENDOCRINE: Keep FS <180    MUSCULOSKELETAL: Increase as tolerated    CODE STATUS: Full         DISPOSITION: SDU         37 year-old Female with past medical history of pancreatitis and gallstones presents with complaint of abdominal pain.  Found to have gallstone pancreatitis.      ASSESSMENT  Gallstone Pancreatitis  Asymptomatic Bacteriuria    Appendicitis?    PLAN    CNS: Avoid CNS Depressants.     HEENT: Oral care. Aspiration precautions     PULMONARY: HOB @ 45. Monitor Pulse Ox. Keep > 93%. Supplement as needed.    CARDIOVASCULAR: Aggressive hydration with LR, MAP adequate. Avoid Overload     GI: GI prophylaxis.  - afebrile, WBC of 14k   - Lipase >3000   - ALK Phos 199/ / / TB 1.8/ DB 1  - CT AP w IVC;  Peripancreatic fat stranding without discrete fluid collection, suggesting mild pancreatitis. 2.  there are inflammatory changes surrounding the mid descending colon   with a fat-containing appendage contiguous to it consistent with epiploic appendicitis  - US showed Echogenic liver compatible with hepatic steatosis. Cholelithiasis and biliary sludge. Mild wall thickening and surrounding inflammatory changes. Findings suggesting acute cholecystitis.  - NPO, advance as tolerated to Low fat, dairy free regular diet, resume feeds once N/V controlled   - IVF: LR @ 250 cc/hr   - pain management: toradol 30 q6h PRN  - continue Zofran for Nausea  - f/u CRP on admission and trend ,  - Monitor LFTs   - Monitor urine output daily - Target Urine output: 0.5cc/kg/hr and adjust fluids accordingly  - Please obtain CTAP w/ IV contrast if clinical deterioration in 48-72 hours  - Advance GI eval and Surgery eval     RENAL: Avoid nephrotoxic agents Replete electrolytes, f/u rpt CMP, manrique     INFECTIOUS DISEASE: Start Ceftriaxone and Flagyl,  f/u BCx//UCx,    HEMATOLOGICAL: DVT prophylaxis, Monitor Hb, no acute signs of bleeding    ENDOCRINE: Keep FS <180    MUSCULOSKELETAL: Increase as tolerated    CODE STATUS: Full         DISPOSITION: GM

## 2023-06-30 NOTE — H&P ADULT - HISTORY OF PRESENT ILLNESS
37 year-old Female with past medical history of pancreatitis and gallstones presents with complaint of abdominal pain.  Patient states abdominal pain has been present for 3 days. She describes the pain as sharp localized to the epigastric region. She endorses that the pain occasionally radiates to the back between the shoulder blades.  She states that the pain has been associated with 4 episodes of nonbilious nonbloody vomiting yesterday and 4 episodes today.  Patient states pain is worse with meals, comes and goes.  Denies fever/chills, chest pain, shortness of breath, diarrhea, lightheadedness/weakness, numbness/tingling. States that this feels like her previous episode of pancreatitis.    Of note patient was hospitalized from 05/05/23 to 05/08/2023, for similar symptoms and was diagnosed with Gallstone Pancreatitis. She had ERCP/EUS on 5/8 which showed no cbd stone, cbd 7mm and was discharged with plan for cholecystectomy as OP.    In ED:   VS were /81, . RR 18, T 97.8, SPO2 95% on RA.   Labs were  significant for:   WBC of 14k , AG 17  UA positive for large ketones, nitrites, LE and bacteria  Lipase >3000   ALK Phos 199/ / / TB 1.8/ DB 1  EKG showed Sinus tachycardia   CXR unremarkable  CT AP showed1.  Peripancreatic fat stranding without discrete fluid collection, suggesting mild pancreatitis. 2.  there are inflammatory changes surrounding the mid descending colon   with a fat-containing appendage contiguous to it consistent with epiploic appendicitis    US showed Echogenic liver compatible with hepatic steatosis. Cholelithiasis and biliary sludge. Mild wall thickening and surrounding inflammatory changes. Findings suggesting acute cholecystitis.  Patient received ceftriaxone and flagyl x1, 1L LR bolus     Patient is being admitted to the ICU for gallstone pancreatitis    37 year-old Female with past medical history of pancreatitis and gallstones presents with complaint of abdominal pain.  Patient states abdominal pain has been present for 3 days. She describes the pain as sharp localized to the epigastric region as well as the left upper quadrant. She stated that the pain after she had peach.  She endorses that the pain occasionally radiates to the back between the shoulder blades.  She states that the pain has been associated with 4 episodes of nonbilious nonbloody vomiting yesterday and 4 episodes today.  Patient states pain is worse with meals, comes and goes. She initially stated that she thought it was food poisoning but since her symptoms did not resolve she decided to come to the ED. Denies any urinary symptoms.   Denies fever/chills, chest pain, shortness of breath, diarrhea, lightheadedness/weakness, numbness/tingling. States that this feels different from her previous episode of pancreatitis as the pain was more right sided at that time.    Of note patient was hospitalized from 05/05/23 to 05/08/2023, for similar symptoms and was diagnosed with Gallstone Pancreatitis. She had ERCP/EUS on 5/8 which showed no cbd stone, cbd 7mm and was discharged with plan for cholecystectomy as OP.    In ED:   VS were /81, . RR 18, T 97.8, SPO2 95% on RA.   Labs were  significant for:   WBC of 14k , AG 17  UA positive for large ketones, nitrites, LE and bacteria  Lipase >3000   ALK Phos 199/ / / TB 1.8/ DB 1    EKG showed Sinus tachycardia     CXR unremarkable  CT AP showed1.  Peripancreatic fat stranding without discrete fluid collection, suggesting mild pancreatitis. 2.  there are inflammatory changes surrounding the mid descending colon   with a fat-containing appendage contiguous to it consistent with epiploic appendicitis    US showed Echogenic liver compatible with hepatic steatosis. Cholelithiasis and biliary sludge. Mild wall thickening and surrounding inflammatory changes. Findings suggesting acute cholecystitis.  Patient received ceftriaxone and flagyl x1, 1L LR bolus     Patient is being admitted to the ICU for gallstone pancreatitis

## 2023-06-30 NOTE — ED ADULT NURSE NOTE - NSFALLUNIVINTERV_ED_ALL_ED
Bed/Stretcher in lowest position, wheels locked, appropriate side rails in place/Call bell, personal items and telephone in reach/Instruct patient to call for assistance before getting out of bed/chair/stretcher/Non-slip footwear applied when patient is off stretcher/[place] to call system/Physically safe environment - no spills, clutter or unnecessary equipment/Purposeful proactive rounding/Room/bathroom lighting operational, light cord in reach

## 2023-06-30 NOTE — ED PROVIDER NOTE - PHYSICAL EXAMINATION
Physical Exam    Vital Signs: I have reviewed the initial vital signs.  Constitutional: appears stated age, appears uncomfortable  Eyes: Sclera clear, EOMI.  Cardiovascular: S1 and S2, regular rate, regular rhythm, well-perfused extremities, radial pulses equal and 2+, pedal pulses 2+ and equal  Respiratory: unlabored respiratory effort, clear to auscultation bilaterally no wheezing, rales and rhonchi  Gastrointestinal: soft, abdomen tender to palpation at epigastrium and LLQ, negative ortega's sign, no pulsatile mass, normal bowl sounds  Musculoskeletal: supple neck, no lower extremity edema, no midline tenderness  Integumentary: warm, dry, no rash  Neurologic: awake, alert, oriented x3, extremities’ motor and sensory functions grossly intact  Psychiatric: appropriate mood, appropriate affect

## 2023-06-30 NOTE — ED ADULT TRIAGE NOTE - CHIEF COMPLAINT QUOTE
Pt complaining of abdominal pain x3 days. Pt is tachycardic (128 bpm) in triage. Pt denies chest pain. EKG done

## 2023-07-01 LAB
ALBUMIN SERPL ELPH-MCNC: 3.6 G/DL — SIGNIFICANT CHANGE UP (ref 3.5–5.2)
ALP SERPL-CCNC: 136 U/L — HIGH (ref 30–115)
ALT FLD-CCNC: 378 U/L — HIGH (ref 0–41)
ANION GAP SERPL CALC-SCNC: 14 MMOL/L — SIGNIFICANT CHANGE UP (ref 7–14)
AST SERPL-CCNC: 99 U/L — HIGH (ref 0–41)
BASOPHILS # BLD AUTO: 0.03 K/UL — SIGNIFICANT CHANGE UP (ref 0–0.2)
BASOPHILS NFR BLD AUTO: 0.3 % — SIGNIFICANT CHANGE UP (ref 0–1)
BILIRUB DIRECT SERPL-MCNC: 0.6 MG/DL — HIGH (ref 0–0.3)
BILIRUB INDIRECT FLD-MCNC: 0.8 MG/DL — SIGNIFICANT CHANGE UP (ref 0.2–1.2)
BILIRUB SERPL-MCNC: 1.4 MG/DL — HIGH (ref 0.2–1.2)
BLD GP AB SCN SERPL QL: SIGNIFICANT CHANGE UP
BUN SERPL-MCNC: 7 MG/DL — LOW (ref 10–20)
CALCIUM SERPL-MCNC: 8 MG/DL — LOW (ref 8.4–10.5)
CHLORIDE SERPL-SCNC: 104 MMOL/L — SIGNIFICANT CHANGE UP (ref 98–110)
CO2 SERPL-SCNC: 20 MMOL/L — SIGNIFICANT CHANGE UP (ref 17–32)
CREAT SERPL-MCNC: 0.6 MG/DL — LOW (ref 0.7–1.5)
CRP SERPL-MCNC: 126.6 MG/L — HIGH
EGFR: 118 ML/MIN/1.73M2 — SIGNIFICANT CHANGE UP
EOSINOPHIL # BLD AUTO: 0.1 K/UL — SIGNIFICANT CHANGE UP (ref 0–0.7)
EOSINOPHIL NFR BLD AUTO: 0.9 % — SIGNIFICANT CHANGE UP (ref 0–8)
GLUCOSE SERPL-MCNC: 105 MG/DL — HIGH (ref 70–99)
HCT VFR BLD CALC: 34.1 % — LOW (ref 37–47)
HCT VFR BLD CALC: 36.6 % — LOW (ref 37–47)
HGB BLD-MCNC: 11.7 G/DL — LOW (ref 12–16)
HGB BLD-MCNC: 12.1 G/DL — SIGNIFICANT CHANGE UP (ref 12–16)
IMM GRANULOCYTES NFR BLD AUTO: 0.3 % — SIGNIFICANT CHANGE UP (ref 0.1–0.3)
LACTATE SERPL-SCNC: 1 MMOL/L — SIGNIFICANT CHANGE UP (ref 0.7–2)
LIDOCAIN IGE QN: 659 U/L — HIGH (ref 7–60)
LYMPHOCYTES # BLD AUTO: 1.81 K/UL — SIGNIFICANT CHANGE UP (ref 1.2–3.4)
LYMPHOCYTES # BLD AUTO: 17.1 % — LOW (ref 20.5–51.1)
MAGNESIUM SERPL-MCNC: 1.8 MG/DL — SIGNIFICANT CHANGE UP (ref 1.8–2.4)
MCHC RBC-ENTMCNC: 30.1 PG — SIGNIFICANT CHANGE UP (ref 27–31)
MCHC RBC-ENTMCNC: 31.1 PG — HIGH (ref 27–31)
MCHC RBC-ENTMCNC: 33.1 G/DL — SIGNIFICANT CHANGE UP (ref 32–37)
MCHC RBC-ENTMCNC: 34.3 G/DL — SIGNIFICANT CHANGE UP (ref 32–37)
MCV RBC AUTO: 90.7 FL — SIGNIFICANT CHANGE UP (ref 81–99)
MCV RBC AUTO: 91 FL — SIGNIFICANT CHANGE UP (ref 81–99)
MONOCYTES # BLD AUTO: 0.83 K/UL — HIGH (ref 0.1–0.6)
MONOCYTES NFR BLD AUTO: 7.8 % — SIGNIFICANT CHANGE UP (ref 1.7–9.3)
NEUTROPHILS # BLD AUTO: 7.8 K/UL — HIGH (ref 1.4–6.5)
NEUTROPHILS NFR BLD AUTO: 73.6 % — SIGNIFICANT CHANGE UP (ref 42.2–75.2)
NRBC # BLD: 0 /100 WBCS — SIGNIFICANT CHANGE UP (ref 0–0)
NRBC # BLD: 0 /100 WBCS — SIGNIFICANT CHANGE UP (ref 0–0)
PLATELET # BLD AUTO: 238 K/UL — SIGNIFICANT CHANGE UP (ref 130–400)
PLATELET # BLD AUTO: 258 K/UL — SIGNIFICANT CHANGE UP (ref 130–400)
PMV BLD: 10.2 FL — SIGNIFICANT CHANGE UP (ref 7.4–10.4)
PMV BLD: 10.3 FL — SIGNIFICANT CHANGE UP (ref 7.4–10.4)
POTASSIUM SERPL-MCNC: 3.6 MMOL/L — SIGNIFICANT CHANGE UP (ref 3.5–5)
POTASSIUM SERPL-SCNC: 3.6 MMOL/L — SIGNIFICANT CHANGE UP (ref 3.5–5)
PROT SERPL-MCNC: 6.1 G/DL — SIGNIFICANT CHANGE UP (ref 6–8)
RBC # BLD: 3.76 M/UL — LOW (ref 4.2–5.4)
RBC # BLD: 4.02 M/UL — LOW (ref 4.2–5.4)
RBC # FLD: 13 % — SIGNIFICANT CHANGE UP (ref 11.5–14.5)
RBC # FLD: 13.1 % — SIGNIFICANT CHANGE UP (ref 11.5–14.5)
SODIUM SERPL-SCNC: 138 MMOL/L — SIGNIFICANT CHANGE UP (ref 135–146)
WBC # BLD: 10.55 K/UL — SIGNIFICANT CHANGE UP (ref 4.8–10.8)
WBC # BLD: 10.6 K/UL — SIGNIFICANT CHANGE UP (ref 4.8–10.8)
WBC # FLD AUTO: 10.55 K/UL — SIGNIFICANT CHANGE UP (ref 4.8–10.8)
WBC # FLD AUTO: 10.6 K/UL — SIGNIFICANT CHANGE UP (ref 4.8–10.8)

## 2023-07-01 PROCEDURE — 99233 SBSQ HOSP IP/OBS HIGH 50: CPT

## 2023-07-01 PROCEDURE — 99223 1ST HOSP IP/OBS HIGH 75: CPT

## 2023-07-01 PROCEDURE — 99223 1ST HOSP IP/OBS HIGH 75: CPT | Mod: GC

## 2023-07-01 RX ORDER — ONDANSETRON 8 MG/1
4 TABLET, FILM COATED ORAL EVERY 8 HOURS
Refills: 0 | Status: DISCONTINUED | OUTPATIENT
Start: 2023-07-01 | End: 2023-07-05

## 2023-07-01 RX ORDER — CHLORHEXIDINE GLUCONATE 213 G/1000ML
1 SOLUTION TOPICAL DAILY
Refills: 0 | Status: DISCONTINUED | OUTPATIENT
Start: 2023-07-01 | End: 2023-07-05

## 2023-07-01 RX ORDER — IBUPROFEN 200 MG
200 TABLET ORAL ONCE
Refills: 0 | Status: COMPLETED | OUTPATIENT
Start: 2023-07-01 | End: 2023-07-01

## 2023-07-01 RX ADMIN — Medication 100 MILLIGRAM(S): at 13:16

## 2023-07-01 RX ADMIN — Medication 100 MILLIGRAM(S): at 05:34

## 2023-07-01 RX ADMIN — SODIUM CHLORIDE 250 MILLILITER(S): 9 INJECTION, SOLUTION INTRAVENOUS at 00:00

## 2023-07-01 RX ADMIN — SODIUM CHLORIDE 250 MILLILITER(S): 9 INJECTION, SOLUTION INTRAVENOUS at 22:08

## 2023-07-01 RX ADMIN — CEFTRIAXONE 100 MILLIGRAM(S): 500 INJECTION, POWDER, FOR SOLUTION INTRAMUSCULAR; INTRAVENOUS at 22:08

## 2023-07-01 RX ADMIN — Medication 200 MILLIGRAM(S): at 13:56

## 2023-07-01 RX ADMIN — Medication 100 MILLIGRAM(S): at 22:09

## 2023-07-01 RX ADMIN — SODIUM CHLORIDE 250 MILLILITER(S): 9 INJECTION, SOLUTION INTRAVENOUS at 15:27

## 2023-07-01 RX ADMIN — CHLORHEXIDINE GLUCONATE 1 APPLICATION(S): 213 SOLUTION TOPICAL at 13:16

## 2023-07-01 RX ADMIN — ENOXAPARIN SODIUM 40 MILLIGRAM(S): 100 INJECTION SUBCUTANEOUS at 13:16

## 2023-07-01 NOTE — PROGRESS NOTE ADULT - ASSESSMENT
37 year-old Female with past medical history of pancreatitis and gallstones presents with complaint of abdominal pain.  Found to have gallstone pancreatitis.      #Gallstone pancreatitis   - Abd pain  - SIRS on admission wbc 14, tachy 114  - HD stable  - CT AP showed1.  Peripancreatic fat stranding without discrete fluid collection, suggesting mild pancreatitis. 2.  there are inflammatory changes surrounding the mid descending colon with a fat-containing appendage contiguous to it consistent with epiploic appendicitis  - US showed Echogenic liver compatible with hepatic steatosis. Cholelithiasis and biliary sludge. Mild wall thickening and surrounding inflammatory changes. Findings suggesting acute cholecystitis.    Plan  - no acute intervention from surgery, interval zay  - Follow up advanced GI  - Trend LFTs  - NPO for now  - continue with IVF  - continue with ceftriaxone and flagyl  - Follow up Bcx     37 year-old Female with past medical history of pancreatitis and gallstones presents with complaint of abdominal pain.  Found to have gallstone pancreatitis.      #Gallstone pancreatitis   - Abd pain  - SIRS on admission wbc 14, tachy 114  - lipase 3k  - HD stable  - CT AP showed1.  Peripancreatic fat stranding without discrete fluid collection, suggesting mild pancreatitis. 2.  there are inflammatory changes surrounding the mid descending colon with a fat-containing appendage contiguous to it consistent with epiploic appendicitis  - US showed Echogenic liver compatible with hepatic steatosis. Cholelithiasis and biliary sludge. Mild wall thickening and surrounding inflammatory changes. Findings suggesting acute cholecystitis.    Plan  - no acute intervention from surgery, interval zay  - Follow up advanced GI  - Trend LFTs  - NPO for now  - protonix qd  - continue with IVF  - continue with ceftriaxone and flagyl  - Follow up Bcx

## 2023-07-01 NOTE — PROGRESS NOTE ADULT - ASSESSMENT
36 y/o F w/ PMHx of pancreatitis and gallstones presents with complaint of abdominal pain.  Found to have gallstone pancreatitis.    PLAN:   - Advanced GI consult: Recommend MRCP if negative would recommend CYY before discharge since patient is high risk for recurrent gall stone pancreatitis.   - Possible OR Wednesday   - NPO   - Continue LR @ 250 cc/ Hour  - Downgrading from MICU to medical floor for continued care       spec [id number]

## 2023-07-01 NOTE — CHART NOTE - NSCHARTNOTEFT_GEN_A_CORE
TRANSFER NOTE    Hospital Course:  37 year-old Female with past medical history of pancreatitis and gallstones presents with complaint of abdominal pain.  Found to have gallstone pancreatitis.      ICU course  Patient admitted to icu, HD stable, no o2 requirements, abdominal pain improved, no acute intervention from surgery, pending advanced GI consult, LFTs downtrending, patient is being downgraded to medical floor for further care    Subjective/ROS: No abd pain, denies dysuria    VITALS  Vital Signs Last 24 Hrs  T(C): 37.1 (01 Jul 2023 04:00), Max: 37.1 (01 Jul 2023 04:00)  T(F): 98.8 (01 Jul 2023 04:00), Max: 98.8 (01 Jul 2023 04:00)  HR: 97 (01 Jul 2023 06:00) (97 - 128)  BP: 129/60 (01 Jul 2023 06:00) (112/70 - 129/60)  BP(mean): 86 (01 Jul 2023 06:00) (85 - 89)  RR: 17 (01 Jul 2023 06:00) (17 - 25)  SpO2: 93% (01 Jul 2023 06:00) (93% - 97%)    Parameters below as of 01 Jul 2023 04:00  Patient On (Oxygen Delivery Method): room air        CAPILLARY BLOOD GLUCOSE          PHYSICAL EXAM  GENERAL: NAD, speaks in full sentences, no signs of respiratory distress  HEAD:  Atraumatic, Normocephalic  EYES: EOMI, PERRLA, anicteric sclera  NECK: Supple, No JVD  CHEST/LUNG: Clear to auscultation bilaterally; No wheeze; No crackles; No accessory muscles used  HEART: Regular rate and rhythm; No murmurs;   ABDOMEN: Soft, non tender, no guarding rigidity  EXTREMITIES:  2+ Peripheral Pulses, No cyanosis or edema  PSYCH: AAOx3  NEUROLOGY: non-focal  SKIN: No rashes or lesions  37 year-old Female with past medical history of pancreatitis and gallstones presents with complaint of abdominal pain.  Found to have gallstone pancreatitis.      MEDICATIONS  (STANDING):  cefTRIAXone   IVPB 2000 milliGRAM(s) IV Intermittent every 24 hours  chlorhexidine 4% Liquid 1 Application(s) Topical <User Schedule>  enoxaparin Injectable 40 milliGRAM(s) SubCutaneous every 24 hours  lactated ringers. 1000 milliLiter(s) (250 mL/Hr) IV Continuous <Continuous>  metroNIDAZOLE  IVPB 500 milliGRAM(s) IV Intermittent every 8 hours    MEDICATIONS  (PRN):  ketorolac   Injectable 30 milliGRAM(s) IV Push every 6 hours PRN Severe Pain (7 - 10)  ondansetron Injectable 4 milliGRAM(s) IV Push every 8 hours PRN Nausea and/or Vomiting      No Known Allergies      LABS                        15.6   14.77 )-----------( 341      ( 30 Jun 2023 14:25 )             45.5     07-01    138  |  104  |  7<L>  ----------------------------<  105<H>  3.6   |  20  |  0.6<L>    Ca    8.0<L>      01 Jul 2023 05:09  Mg     1.8     07-01    TPro  6.1  /  Alb  3.6  /  TBili  1.4<H>  /  DBili  0.6<H>  /  AST  99<H>  /  ALT  378<H>  /  AlkPhos  136<H>  07-01      Urinalysis Basic - ( 01 Jul 2023 05:09 )    Color: x / Appearance: x / SG: x / pH: x  Gluc: 105 mg/dL / Ketone: x  / Bili: x / Urobili: x   Blood: x / Protein: x / Nitrite: x   Leuk Esterase: x / RBC: x / WBC x   Sq Epi: x / Non Sq Epi: x / Bacteria: x      CARDIAC MARKERS ( 30 Jun 2023 14:25 )  x     / <0.01 ng/mL / x     / x     / x    Assessment and plan  37 year-old Female with past medical history of pancreatitis and gallstones presents with complaint of abdominal pain.  Found to have gallstone pancreatitis.      #Gallstone pancreatitis   - Abd pain  - SIRS on admission wbc 14, tachy 114  - lipase 3k  - HD stable  - TG wnl  - CT AP showed1.  Peripancreatic fat stranding without discrete fluid collection, suggesting mild pancreatitis. 2.  there are inflammatory changes surrounding the mid descending colon with a fat-containing appendage contiguous to it consistent with epiploic appendicitis  - US showed Echogenic liver compatible with hepatic steatosis. Cholelithiasis and biliary sludge. Mild wall thickening and surrounding inflammatory changes. Findings suggesting acute cholecystitis.  - LFTs downtrending, Bili wnl now  - CRP 120s, repeat in 48 hrs    Plan  - no acute intervention from surgery, interval zay  - Follow up advanced GI  - Trend LFTs  - pain control  - NPO for now  - protonix qd  - continue with IVF  - continue with ceftriaxone and flagyl  - Follow up Bcx    #Asymptomatic bacteruria  - denies urinary complaints/lower abd tenderness  - UA pyuria, bacteria +ve, epithelial cells +ve    Pending  - advanced GI Follow up  - restart diet when able TRANSFER NOTE    Hospital Course:  37 year-old Female with past medical history of pancreatitis and gallstones presents with complaint of abdominal pain.  Found to have gallstone pancreatitis.      ICU course  Patient admitted to icu, HD stable, no o2 requirements, abdominal pain improved, no acute intervention from surgery, pending advanced GI consult, LFTs downtrending, patient is being downgraded to medical floor for further care    Subjective/ROS: No abd pain, denies dysuria    VITALS  Vital Signs Last 24 Hrs  T(C): 37.1 (01 Jul 2023 04:00), Max: 37.1 (01 Jul 2023 04:00)  T(F): 98.8 (01 Jul 2023 04:00), Max: 98.8 (01 Jul 2023 04:00)  HR: 97 (01 Jul 2023 06:00) (97 - 128)  BP: 129/60 (01 Jul 2023 06:00) (112/70 - 129/60)  BP(mean): 86 (01 Jul 2023 06:00) (85 - 89)  RR: 17 (01 Jul 2023 06:00) (17 - 25)  SpO2: 93% (01 Jul 2023 06:00) (93% - 97%)    Parameters below as of 01 Jul 2023 04:00  Patient On (Oxygen Delivery Method): room air        CAPILLARY BLOOD GLUCOSE          PHYSICAL EXAM  GENERAL: NAD, speaks in full sentences, no signs of respiratory distress  HEAD:  Atraumatic, Normocephalic  EYES: EOMI, PERRLA, anicteric sclera  NECK: Supple, No JVD  CHEST/LUNG: Clear to auscultation bilaterally; No wheeze; No crackles; No accessory muscles used  HEART: Regular rate and rhythm; No murmurs;   ABDOMEN: Soft, non tender, no guarding rigidity  EXTREMITIES:  2+ Peripheral Pulses, No cyanosis or edema  PSYCH: AAOx3  NEUROLOGY: non-focal  SKIN: No rashes or lesions  37 year-old Female with past medical history of pancreatitis and gallstones presents with complaint of abdominal pain.  Found to have gallstone pancreatitis.      MEDICATIONS  (STANDING):  cefTRIAXone   IVPB 2000 milliGRAM(s) IV Intermittent every 24 hours  chlorhexidine 4% Liquid 1 Application(s) Topical <User Schedule>  enoxaparin Injectable 40 milliGRAM(s) SubCutaneous every 24 hours  lactated ringers. 1000 milliLiter(s) (250 mL/Hr) IV Continuous <Continuous>  metroNIDAZOLE  IVPB 500 milliGRAM(s) IV Intermittent every 8 hours    MEDICATIONS  (PRN):  ketorolac   Injectable 30 milliGRAM(s) IV Push every 6 hours PRN Severe Pain (7 - 10)  ondansetron Injectable 4 milliGRAM(s) IV Push every 8 hours PRN Nausea and/or Vomiting      No Known Allergies      LABS                        15.6   14.77 )-----------( 341      ( 30 Jun 2023 14:25 )             45.5     07-01    138  |  104  |  7<L>  ----------------------------<  105<H>  3.6   |  20  |  0.6<L>    Ca    8.0<L>      01 Jul 2023 05:09  Mg     1.8     07-01    TPro  6.1  /  Alb  3.6  /  TBili  1.4<H>  /  DBili  0.6<H>  /  AST  99<H>  /  ALT  378<H>  /  AlkPhos  136<H>  07-01      Urinalysis Basic - ( 01 Jul 2023 05:09 )    Color: x / Appearance: x / SG: x / pH: x  Gluc: 105 mg/dL / Ketone: x  / Bili: x / Urobili: x   Blood: x / Protein: x / Nitrite: x   Leuk Esterase: x / RBC: x / WBC x   Sq Epi: x / Non Sq Epi: x / Bacteria: x      CARDIAC MARKERS ( 30 Jun 2023 14:25 )  x     / <0.01 ng/mL / x     / x     / x    Assessment and plan  37 year-old Female with past medical history of pancreatitis and gallstones presents with complaint of abdominal pain.  Found to have gallstone pancreatitis.      #Gallstone pancreatitis   - Abd pain  - SIRS on admission wbc 14, tachy 114  - lipase 3k  - HD stable  - TG wnl  - CT AP showed1.  Peripancreatic fat stranding without discrete fluid collection, suggesting mild pancreatitis. 2.  there are inflammatory changes surrounding the mid descending colon with a fat-containing appendage contiguous to it consistent with epiploic appendicitis  - US showed Echogenic liver compatible with hepatic steatosis. Cholelithiasis and biliary sludge. Mild wall thickening and surrounding inflammatory changes. Findings suggesting acute cholecystitis.  - LFTs and T bili downtrending  - CRP 120s, repeat in 48 hrs    Plan  - no acute intervention from surgery, interval zay  - Follow up advanced GI  - Trend LFTs  - pain control  - NPO for now  - protonix qd  - continue with IVF  - continue with ceftriaxone and flagyl  - Follow up Bcx    #Asymptomatic bacteruria  - denies urinary complaints/lower abd tenderness  - UA pyuria, bacteria +ve, epithelial cells +ve    Pending  - advanced GI Follow up  - restart diet when able

## 2023-07-01 NOTE — PROGRESS NOTE ADULT - SUBJECTIVE AND OBJECTIVE BOX
GENERAL SURGERY PROGRESS NOTE     KATI RAYMUNDO  Female  Hospital day :2d     OVERNIGHT EVENTS: Stable abdominal pain. Continues NPO w/ IVF. No n/v.     T(F): 99.1 (07-01-23 @ 08:00), Max: 99.1 (07-01-23 @ 08:00)  HR: 107 (07-01-23 @ 11:00) (97 - 128)  BP: 124/59 (07-01-23 @ 11:00) (112/70 - 137/65)  RR: 23 (07-01-23 @ 11:00) (17 - 32)  SpO2: 95% (07-01-23 @ 11:00) (93% - 97%)    DIET/FLUIDS: lactated ringers. 1000 milliLiter(s) IV Continuous <Continuous>      AC/ proph: enoxaparin Injectable 40 milliGRAM(s) SubCutaneous every 24 hours    ABx: cefTRIAXone   IVPB 2000 milliGRAM(s) IV Intermittent every 24 hours  metroNIDAZOLE  IVPB 500 milliGRAM(s) IV Intermittent every 8 hours      PHYSICAL EXAM:  GENERAL: NAD, well-appearing  CHEST/LUNG: Breathing comfortably  HEART: Regular rate  ABDOMEN: Soft, Nondistended; Mild tenderness LLQ   EXTREMITIES:  No edema      LABS:                      12.1   10.60 )-----------( 258      ( 01 Jul 2023 05:09 )             36.6       Auto Neutrophil %: 73.6 % (07-01-23 @ 05:09)  Auto Immature Granulocyte %: 0.3 % (07-01-23 @ 05:09)  Auto Neutrophil %: 79.5 % (06-30-23 @ 14:25)  Auto Immature Granulocyte %: 0.4 % (06-30-23 @ 14:25)    07-01    138  |  104  |  7<L>  ----------------------------<  105<H>  3.6   |  20  |  0.6<L>      Calcium: 8.0 mg/dL (07-01-23 @ 05:09)      LFTs:             6.1  | 1.4  | 99       ------------------[136     ( 01 Jul 2023 05:09 )  3.6  | 0.6  | 378         Lipase:659        Lactate, Blood: 1.0 mmol/L (07-01-23 @ 05:09)  Lactate, Blood: 1.3 mmol/L (06-30-23 @ 14:25)      CARDIAC MARKERS ( 30 Jun 2023 14:25 )  x     / <0.01 ng/mL / x     / x     / x          Urinalysis Basic - ( 01 Jul 2023 05:09 )    Color: x / Appearance: x / SG: x / pH: x  Gluc: 105 mg/dL / Ketone: x  / Bili: x / Urobili: x   Blood: x / Protein: x / Nitrite: x   Leuk Esterase: x / RBC: x / WBC x   Sq Epi: x / Non Sq Epi: x / Bacteria: x    RADIOLOGY:   < from: US Abdomen Upper Quadrant Right (06.30.23 @ 16:16) >  IMPRESSION:    Echogenic liver compatible with hepatic steatosis.    Cholelithiasis and biliary sludge. Mild wall thickening and surrounding   inflammatory changes. Findings suggesting acute cholecystitis.    < end of copied text >      < from: CT Abdomen and Pelvis w/ IV Cont (06.30.23 @ 16:08) >  IMPRESSION:    1.  Peripancreatic fat stranding without discrete fluid collection,   suggesting mild pancreatitis. Correlation with laboratory findings   suggested  2.  there are inflammatory changes surrounding the mid descending colon   with a fat-containing appendage contiguous to it consistent with epiploic   appendigitis    < end of copied text >      < from: Xray Chest 1 View- PORTABLE-Urgent (06.30.23 @ 14:52) >  Impression:    No radiographic evidence of acute cardiopulmonary disease.      < end of copied text >

## 2023-07-01 NOTE — CONSULT NOTE ADULT - SUBJECTIVE AND OBJECTIVE BOX
Gastroenterology Consultation:    Patient is a 37y old  Female who presents with a chief complaint of Gallstone Pancreatitis (01 Jul 2023 06:22)  Admitted on: 06-30-23      HPI:  37 year-old Female with past medical history of pancreatitis and gallstones presents with complaint of abdominal pain.  Patient states abdominal pain has been present for 3 days. She describes the pain as sharp localized to the epigastric region as well as the left upper quadrant. She endorses that the pain occasionally radiates to the back between the shoulder blades.  She states that the pain has been associated with 4 episodes of nonbilious nonbloody vomiting yesterday and 4 episodes today.  Patient states pain is worse with meals, comes and goes. She initially stated that she thought it was food poisoning but since her symptoms did not resolve she decided to come to the ED.   Denies fever, chills, chest pain, shortness of breath, diarrhea, lightheadedness, passing gas, no bms since yesterday.   Of note patient was hospitalized from 05/05/23 to 05/08/2023, for similar symptoms and was diagnosed with Gallstone Pancreatitis. She had EUS on 5/8 which showed no cbd stone, cbd 7mm and was discharged with plan for cholecystectomy as OP. In ED patient was tachycardic Lipase >3000 ALK Phos 199/ / / TB 1.8/ DB . CT AP showed Peripancreatic fat stranding without discrete fluid collection, suggesting mild pancreatitis. There are inflammatory changes surrounding the mid descending colon   with a fat-containing appendage contiguous to it consistent with epiploic appendicitis. US showed Echogenic liver compatible with hepatic steatosis. Cholelithiasis and biliary sludge. Mild wall thickening and surrounding inflammatory changes. Patient is being admitted to the ICU for gallstone pancreatitis     Prior EGD/ Colonoscopy:  < from: Upper EUS (05.08.23 @ 07:30) >  Impressions:    EUS focused examination: The common bile duct was also traced from the hilum  to the ampulla, and measured 7mm, no stones were noted. The major papilla was  normal with no stones. .    EGD: small hiatal hernia(Hill grade II), non erosive gastritis, biopsies  obtained. .    < end of copied text >      PAST MEDICAL & SURGICAL HISTORY:  Pancreatitis            FAMILY HISTORY:  No FH of GI cancers     Social History:  Tobacco: Denies   Alcohol: Denies   Drugs: Denies     Home Medications:        MEDICATIONS  (STANDING):  cefTRIAXone   IVPB 2000 milliGRAM(s) IV Intermittent every 24 hours  chlorhexidine 2% Cloths 1 Application(s) Topical daily  chlorhexidine 4% Liquid 1 Application(s) Topical <User Schedule>  enoxaparin Injectable 40 milliGRAM(s) SubCutaneous every 24 hours  lactated ringers. 1000 milliLiter(s) (250 mL/Hr) IV Continuous <Continuous>  metroNIDAZOLE  IVPB 500 milliGRAM(s) IV Intermittent every 8 hours    MEDICATIONS  (PRN):  ketorolac   Injectable 30 milliGRAM(s) IV Push every 6 hours PRN Severe Pain (7 - 10)  ondansetron Injectable 4 milliGRAM(s) IV Push every 8 hours PRN Nausea and/or Vomiting      Allergies  No Known Allergies      Review of Systems:   Constitutional:  No Fever, No Chills  ENT/Mouth:  No Hearing Changes,  No Difficulty Swallowing  Eyes:  No Eye Pain, No Vision Changes  Cardiovascular:  No Chest Pain, No Palpitations  Respiratory:  No Cough, No Dyspnea  Gastrointestinal:  As described in HPI  Musculoskeletal:  No Joint Swelling, No Back Pain  Skin:  No Skin Lesions, No Jaundice  Neuro:  No Syncope, No Dizziness  Heme/Lymph:  No Bruising, No Bleeding.          Physical Examination:  T(C): 37.3 (07-01-23 @ 08:00), Max: 37.3 (07-01-23 @ 08:00)  HR: 107 (07-01-23 @ 11:00) (97 - 128)  BP: 124/59 (07-01-23 @ 11:00) (112/70 - 137/65)  RR: 23 (07-01-23 @ 11:00) (17 - 32)  SpO2: 95% (07-01-23 @ 11:00) (93% - 97%)  Height (cm): 165.1 (07-01-23 @ 04:00)  Weight (kg): 105.5 (07-01-23 @ 04:00)    06-30-23 @ 07:01  -  07-01-23 @ 07:00  --------------------------------------------------------  IN: 250 mL / OUT: 0 mL / NET: 250 mL    07-01-23 @ 07:01  -  07-01-23 @ 11:36  --------------------------------------------------------  IN: 1000 mL / OUT: 400 mL / NET: 600 mL          GENERAL: AAOx3, no acute distress.  HEAD:  Atraumatic, Normocephalic  EYES: conjunctiva and sclera clear  NECK: Supple, no JVD or thyromegaly  CHEST/LUNG: Clear to auscultation bilaterally; No wheeze, rhonchi, or rales  HEART: Regular rate and rhythm; normal S1, S2, No murmurs.  ABDOMEN: Soft, nontender, nondistended; Bowel sounds present  NEUROLOGY: No asterixis or tremor.   SKIN: Intact, no jaundice        Data:                        12.1   10.60 )-----------( 258      ( 01 Jul 2023 05:09 )             36.6     Hgb Trend:  12.1  07-01-23 @ 05:09  15.6  06-30-23 @ 14:25        07-01    138  |  104  |  7<L>  ----------------------------<  105<H>  3.6   |  20  |  0.6<L>    Ca    8.0<L>      01 Jul 2023 05:09  Mg     1.8     07-01    TPro  6.1  /  Alb  3.6  /  TBili  1.4<H>  /  DBili  0.6<H>  /  AST  99<H>  /  ALT  378<H>  /  AlkPhos  136<H>  07-01    Liver panel trend:  TBili 1.4   /   AST 99   /      /   AlkP 136   /   Tptn 6.1   /   Alb 3.6    /   DBili 0.6      07-01  TBili 1.8   /      /      /   AlkP 199   /   Tptn 7.7   /   Alb 4.5    /   DBili 1.0      06-30              Radiology:  CT Abdomen and Pelvis w/ IV Cont:   ACC: [id number] EXAM:  CT ABDOMEN AND PELVIS IC   ORDERED BY: ADRIAN STRONG     PROCEDURE DATE:  06/30/2023          INTERPRETATION:  CLINICAL HISTORY / REASON FOR EXAM: Abdominal pain for 3   days.    TECHNIQUE: Contiguous axial CT images were obtained from the lower chest   to the pubic symphysis following administration of 95 mL Omnipaque 350   intravenous contrast, 5 mL discarded . Oral contrast was not   administered. Coronal and sagittal reformats were submitted for review.   MIP reconstructions were also submitted for review.    COMPARISON CT: CT abdomen pelvis 5/5/2023    FINDINGS:    LOWER CHEST: Bibasilar subsegmental atelectasis.    HEPATOBILIARY: Hepatic steatosis. Cholelithiasis. Common bile duct is   mildly dilated at 0.7 cm.    SPLEEN: Unremarkable.    PANCREAS: Peripancreatic fat stranding without discrete fluid collection.   Homogenous parenchymal enhancement.    ADRENAL GLANDS: Unremarkable.    KIDNEYS: Left renal hypodensity too small to characterize. Symmetric   renal enhancement. No hydronephrosis..    ABDOMINOPELVIC NODES: No enlarged abdominal or pelvic lymph nodes.    PELVIC ORGANS: Calcified uterine fibroid. Otherwise unremarkable.    PERITONEUM/MESENTERY/BOWEL there are inflammatory changes surrounding the   mid descending colon with a fat-containing appendage contiguous to it   consistent with epiploic appendigitis Abdominal pelvic ascites, likely   from pancreatitis. No intraperitoneal free air, ascites, or bowel   obstruction. The appendix is not seen however no inflammatory changes are   seen in the right lower quadrant    BONES/SOFT TISSUES: No acute osseous abnormality. Small fat-containing   umbilical hernia.    VASCULAR: The abdominal aorta is of normal caliber..      IMPRESSION:    1.  Peripancreatic fat stranding without discrete fluid collection,   suggesting mild pancreatitis. Correlation with laboratory findings   suggested  2.  there are inflammatory changes surrounding the mid descending colon   with a fat-containing appendage contiguous to it consistent with epiploic   appendigitis    --- End of Report ---          TAYLOR BRUMFIELD MD; Resident Radiologist  This document has been electronically signed.  SYLVIA ALLRED MD; Attending Radiologist  This document has been electronically signed. Jun 30 2023  5:51PM (06-30-23 @ 16:08)    US Abdomen Upper Quadrant Right:   ACC: [id number] EXAM:  US ABDOMEN RT UPR QUADRANT   ORDERED BY: ADRIAN STRONG     PROCEDURE DATE:  06/30/2023          INTERPRETATION:  CLINICAL INFORMATION: Abdominal pain    COMPARISON: Correlated with CT abdomen and pelvis 6/30/2023.    TECHNIQUE: Sonography of the right upper quadrant.    FINDINGS:  Liver: Echogenic liver.  Bile ducts: Normal caliber. Common bile duct measures 8 mm.  Gallbladder: Cholelithiasis and biliary sludge. Mild wall thickening and   surrounding inflammatory changes.  Pancreas: Visualized portions are within normal limits.  Right kidney: 11.6 cm. No hydronephrosis.  Ascites: None.  IVC: Visualized portions are within normal limits.    IMPRESSION:    Echogenic liver compatible with hepatic steatosis.    Cholelithiasis and biliary sludge. Mild wall thickening and surrounding   inflammatory changes. Findings suggesting acute cholecystitis.    --- End of Report ---          TAYLOR BRUMFIELD MD; Resident Radiologist  This document has been electronically signed.  SYLVIA ALLRED MD; Attending Radiologist  This document has been electronically signed. Jun 30 2023  6:04PM (06-30-23 @ 16:16)    
GENERAL SURGERY CONSULT NOTE    Patient: KATI RAYMUNDO , 37y ([birth year])Female   MRN: [id number]  Location: [facility] ED Hold 025 A  Visit: 23 Inpatient  Date: 23 @ 21:03    HPI:  37 year-old Female with past medical history of pancreatitis and gallstones presents with complaint of abdominal pain.  Patient states abdominal pain has been present for 3 days. She describes the pain as sharp localized to the epigastric region. She endorses that the pain occasionally radiates to the back between the shoulder blades.  She states that the pain has been associated with 4 episodes of nonbilious nonbloody vomiting yesterday and 4 episodes today.  Patient states pain is worse with meals, comes and goes.  Denies fever/chills, chest pain, shortness of breath, diarrhea, lightheadedness/weakness, numbness/tingling. States that this feels like her previous episode of pancreatitis.    Of note patient was hospitalized from 23 to 2023, for similar symptoms and was diagnosed with Gallstone Pancreatitis. She had ERCP/EUS on  which showed no cbd stone, cbd 7mm and was discharged with plan for cholecystectomy as OP.    General surgery consulted for management of recurrent gallstone pancreatitis.     In ED:   VS were /81, . RR 18, T 97.8, SPO2 95% on RA.   Labs were  significant for:   WBC of 14k , AG 17  UA positive for large ketones, nitrites, LE and bacteria  Lipase >3000   ALK Phos 199/ / / TB 1.8/ DB 1  EKG showed Sinus tachycardia   CXR unremarkable  CT AP showed1.  Peripancreatic fat stranding without discrete fluid collection, suggesting mild pancreatitis. 2.  there are inflammatory changes surrounding the mid descending colon   with a fat-containing appendage contiguous to it consistent with epiploic appendicitis    US showed Echogenic liver compatible with hepatic steatosis. Cholelithiasis and biliary sludge. Mild wall thickening and surrounding inflammatory changes. Findings suggesting acute cholecystitis.  Patient received ceftriaxone and flagyl x1, 1L LR bolus     Patient is being admitted to the ICU for gallstone pancreatitis    (2023 20:19)    PAST MEDICAL & SURGICAL HISTORY:  Pancreatitis    Home Medications:    VITALS:  T(F): 97.8 (23 @ 12:46), Max: 97.8 (23 @ 12:46)  HR: 105 (23 @ 16:51) (105 - 128)  BP: 112/70 (23 @ 16:51) (112/70 - 116/81)  RR: 18 (23 @ 16:51) (18 - 18)  SpO2: 97% (23 @ 16:51) (95% - 97%)    PHYSICAL EXAM:  General: NAD, AAOx3, calm and cooperative  HEENT: NCAT, JULISSA, EOMI, Trachea ML, Neck supple  Cardiac: RRR S1, S2, no Murmurs, rubs or gallops  Respiratory: CTAB, normal respiratory effort  Abdomen: Soft, non-distended, mild epigastric tenderness, no rebound, no guarding  Musculoskeletal: Strength 5/5 BL UE/LE, ROM intact, compartments soft  Neuro: Sensation grossly intact and equal throughout, no focal deficits  Vascular: Pulses 2+ throughout, extremities well perfused  Skin: Warm/dry, normal color, no jaundice    MEDICATIONS  (STANDING):  lactated ringers. 1000 milliLiter(s) (250 mL/Hr) IV Continuous <Continuous>    MEDICATIONS  (PRN):    LAB/STUDIES:                        15.6   14.77 )-----------( 341      ( 2023 14:25 )             45.5         134<L>  |  95<L>  |  8<L>  ----------------------------<  116<H>  3.7   |  22  |  0.6<L>    Ca    9.2      2023 14:25    TPro  7.7  /  Alb  4.5  /  TBili  1.8<H>  /  DBili  1.0<H>  /  AST  220<H>  /  ALT  639<H>  /  AlkPhos  199<H>        LIVER FUNCTIONS - ( 2023 14:25 )  Alb: 4.5 g/dL / Pro: 7.7 g/dL / ALK PHOS: 199 U/L / ALT: 639 U/L / AST: 220 U/L / GGT: x           Urinalysis Basic - ( 2023 14:25 )    Color: Ana / Appearance: Slightly Turbid / S.026 / pH: x  Gluc: 116 mg/dL / Ketone: Large  / Bili: Small / Urobili: <2 mg/dL   Blood: x / Protein: 30 mg/dL / Nitrite: Positive   Leuk Esterase: Large / RBC: 4 /HPF / WBC 61 /HPF   Sq Epi: x / Non Sq Epi: x / Bacteria: Moderate      CARDIAC MARKERS ( 2023 14:25 )  x     / <0.01 ng/mL / x     / x     / x        IMAGING:  < from: CT Abdomen and Pelvis w/ IV Cont (23 @ 16:08) >  1.  Peripancreatic fat stranding without discrete fluid collection,   suggesting mild pancreatitis. Correlation with laboratory findings   suggested  2.  there are inflammatory changes surrounding the mid descending colon   with a fat-containing appendage contiguous to it consistent with epiploic   appendigitis    < from: US Abdomen Upper Quadrant Right (23 @ 16:16) >  Echogenic liver compatible with hepatic steatosis.    Cholelithiasis and biliary sludge. Mild wall thickening and surrounding   inflammatory changes. Findings suggesting acute cholecystitis.

## 2023-07-01 NOTE — CONSULT NOTE ADULT - ATTENDING COMMENTS
patient seen and evaluated. vital signs stable. abdomen soft, mildly tender in epigastric area.   impression- gallstone pancreatitis. recommend npo, iv fluids. GI consult for imaging of the duct. will need lap zay after pancreatitis resolved.
I edited the note

## 2023-07-01 NOTE — PATIENT PROFILE ADULT - FALL HARM RISK - HARM RISK INTERVENTIONS

## 2023-07-01 NOTE — PROGRESS NOTE ADULT - SUBJECTIVE AND OBJECTIVE BOX
KATI RAYMUNDO 37y Female  MRN#: [id number]   CODE STATUS:________    Hospital Day: 1d    Pt is currently admitted with the primary diagnosis of Gallstone pancreatitis    SUBJECTIVE  Hospital Course  37 year-old Female with past medical history of pancreatitis and gallstones presents with complaint of abdominal pain.  Found to have gallstone pancreatitis.     - Stable abd pain, continue with fluids, npo for now, Follow up advanced GI    Overnight events   None significant    Subjective complaints   No new complaints    Present Today:   - Verma:  No [  ], Yes [   ] : Indication:     - Type of IV Access:       .. CVC/Piccline:  No [  ], Yes [   ] : Indication:       .. Midline: No [  ], Yes [   ] : Indication:                                             ----------------------------------------------------------  OBJECTIVE  PAST MEDICAL & SURGICAL HISTORY  Pancreatitis                                              -----------------------------------------------------------  ALLERGIES:  No Known Allergies                                            ------------------------------------------------------------    HOME MEDICATIONS  Home Medications:                           MEDICATIONS:  STANDING MEDICATIONS  cefTRIAXone   IVPB 2000 milliGRAM(s) IV Intermittent every 24 hours  chlorhexidine 4% Liquid 1 Application(s) Topical <User Schedule>  enoxaparin Injectable 40 milliGRAM(s) SubCutaneous every 24 hours  lactated ringers. 1000 milliLiter(s) IV Continuous <Continuous>  metroNIDAZOLE  IVPB 500 milliGRAM(s) IV Intermittent every 8 hours    PRN MEDICATIONS  ketorolac   Injectable 30 milliGRAM(s) IV Push every 6 hours PRN  ondansetron Injectable 4 milliGRAM(s) IV Push every 8 hours PRN                                            ------------------------------------------------------------  VITAL SIGNS: Last 24 Hours  T(C): 37.1 (2023 04:00), Max: 37.1 (2023 04:00)  T(F): 98.8 (2023 04:00), Max: 98.8 (2023 04:00)  HR: 97 (2023 06:00) (97 - 128)  BP: 129/60 (2023 06:00) (112/70 - 129/60)  BP(mean): 86 (:) (85 - 89)  RR: 17 (:) (17 - 25)  SpO2: 93% (:) (93% - 97%)      23 @ 07:01  -  07- @ 06:23  --------------------------------------------------------  IN: 250 mL / OUT: 0 mL / NET: 250 mL                                             --------------------------------------------------------------  LABS:                        15.6   14.77 )-----------( 341      ( 2023 14:25 )             45.5     06-30    134<L>  |  95<L>  |  8<L>  ----------------------------<  116<H>  3.7   |  22  |  0.6<L>    Ca    9.2      2023 14:25    TPro  7.7  /  Alb  4.5  /  TBili  1.8<H>  /  DBili  1.0<H>  /  AST  220<H>  /  ALT  639<H>  /  AlkPhos  199<H>        Urinalysis Basic - ( 2023 14:25 )    Color: Ana / Appearance: Slightly Turbid / S.026 / pH: x  Gluc: 116 mg/dL / Ketone: Large  / Bili: Small / Urobili: <2 mg/dL   Blood: x / Protein: 30 mg/dL / Nitrite: Positive   Leuk Esterase: Large / RBC: 4 /HPF / WBC 61 /HPF   Sq Epi: x / Non Sq Epi: x / Bacteria: Moderate        Lactate, Blood: 1.3 mmol/L (23 @ 14:25)  Troponin T, Serum: <0.01 ng/mL (23 @ 14:25)          CARDIAC MARKERS ( 2023 14:25 )  x     / <0.01 ng/mL / x     / x     / x                                                  -------------------------------------------------------------  RADIOLOGY:                                            --------------------------------------------------------------    PHYSICAL EXAM:  GENERAL: NAD, speaks in full sentences, no signs of respiratory distress  HEAD:  Atraumatic, Normocephalic  EYES: EOMI, PERRLA, anicteric sclera  NECK: Supple, No JVD  CHEST/LUNG: Clear to auscultation bilaterally; No wheeze; No crackles; No accessory muscles used  HEART: Regular rate and rhythm; No murmurs;   ABDOMEN: Soft, epigastric and left upper quadrant tenderness, Nondistended; Bowel sounds present; No guarding  EXTREMITIES:  2+ Peripheral Pulses, No cyanosis or edema  PSYCH: AAOx3  NEUROLOGY: non-focal  SKIN: No rashes or lesions

## 2023-07-01 NOTE — CONSULT NOTE ADULT - ASSESSMENT
37 year-old Female with past medical history of pancreatitis and gallstones presents with complaint of abdominal pain.  Patient states abdominal pain has been present for 3 days. Patient is being admitted to the ICU for gallstone pancreatitis     # Gall stone pancreatitis (Second episode)   Was hospitalized from 05/05/23 to 05/08/2023, for similar symptoms and was diagnosed with Gallstone Pancreatitis. She had EUS on 5/8 which showed no cbd stone, cbd 7mm and was discharged with plan for cholecystectomy as OP.   This admission: patient is tachycardic,  Lipase >3000 ALK Phos 199/ / / TB 1.8/ . CT AP showed Peripancreatic fat stranding without discrete fluid collection, suggesting mild pancreatitis. US showed Echogenic liver compatible with hepatic steatosis. Cholelithiasis and biliary sludge. Mild wall thickening and surrounding inflammatory changes.     PLAN   NPO   LR @ 250 cc/ Hour   MICU monitoring   Surgery Follow up. Recommend MRCP if negative would recommend CYY before discharge since patient is high risk for recurrent gall stone pancreatitis.   LFTs improving. Will consider ERCP in case of worsening LFTs or any evidence of impacted stones.   Supportive care per MICU team   Pain control     # Hepatic steatosis   - Follow up with our Hepatology Clinic located at [address] [place] [address]. Phone Number: [phone]     37 year-old Female with past medical history of pancreatitis and gallstones presents with complaint of abdominal pain.  Patient states abdominal pain has been present for 3 days. Patient is being admitted to the ICU for gallstone pancreatitis     # Gall stone pancreatitis (Second episode)   Was hospitalized from 05/05/23 to 05/08/2023, for similar symptoms and was diagnosed with Gallstone Pancreatitis. She had EUS on 5/8 which showed no cbd stone, cbd 7mm and was discharged with plan for cholecystectomy as OP.   This admission: patient is tachycardic,  Lipase >3000 ALK Phos 199/ / / TB 1.8/ . CT AP showed Peripancreatic fat stranding without discrete fluid collection, suggesting mild pancreatitis. US showed Echogenic liver compatible with hepatic steatosis. Cholelithiasis and biliary sludge. Mild wall thickening and surrounding inflammatory changes.     PLAN   NPO   LR 2 Ls bolus followed by  @ 250 cc/ Hour   MICU monitoring   Surgery Follow up. Recommend MRCP if negative would recommend Cholecystectomy  before discharge since patient is high risk for recurrent gall stone pancreatitis.   LFTs improving. Will consider ERCP in case of worsening LFTs or any evidence of impacted stones.   Supportive care per MICU team   Pain control     # Hepatic steatosis   - Follow up with our Hepatology Clinic located at [address] [place] [address]. Phone Number: [phone]

## 2023-07-02 LAB
ALBUMIN SERPL ELPH-MCNC: 3.3 G/DL — LOW (ref 3.5–5.2)
ALP SERPL-CCNC: 113 U/L — SIGNIFICANT CHANGE UP (ref 30–115)
ALT FLD-CCNC: 219 U/L — HIGH (ref 0–41)
ANION GAP SERPL CALC-SCNC: 14 MMOL/L — SIGNIFICANT CHANGE UP (ref 7–14)
AST SERPL-CCNC: 43 U/L — HIGH (ref 0–41)
BASOPHILS # BLD AUTO: 0.02 K/UL — SIGNIFICANT CHANGE UP (ref 0–0.2)
BASOPHILS NFR BLD AUTO: 0.2 % — SIGNIFICANT CHANGE UP (ref 0–1)
BILIRUB SERPL-MCNC: 0.8 MG/DL — SIGNIFICANT CHANGE UP (ref 0.2–1.2)
BUN SERPL-MCNC: 4 MG/DL — LOW (ref 10–20)
CALCIUM SERPL-MCNC: 7.7 MG/DL — LOW (ref 8.4–10.5)
CHLORIDE SERPL-SCNC: 101 MMOL/L — SIGNIFICANT CHANGE UP (ref 98–110)
CO2 SERPL-SCNC: 20 MMOL/L — SIGNIFICANT CHANGE UP (ref 17–32)
CREAT SERPL-MCNC: 0.5 MG/DL — LOW (ref 0.7–1.5)
CRP SERPL-MCNC: 183.9 MG/L — HIGH
EGFR: 124 ML/MIN/1.73M2 — SIGNIFICANT CHANGE UP
EOSINOPHIL # BLD AUTO: 0.12 K/UL — SIGNIFICANT CHANGE UP (ref 0–0.7)
EOSINOPHIL NFR BLD AUTO: 1.2 % — SIGNIFICANT CHANGE UP (ref 0–8)
GLUCOSE SERPL-MCNC: 77 MG/DL — SIGNIFICANT CHANGE UP (ref 70–99)
HCT VFR BLD CALC: 33.5 % — LOW (ref 37–47)
HGB BLD-MCNC: 11.4 G/DL — LOW (ref 12–16)
IMM GRANULOCYTES NFR BLD AUTO: 0.5 % — HIGH (ref 0.1–0.3)
LYMPHOCYTES # BLD AUTO: 1.74 K/UL — SIGNIFICANT CHANGE UP (ref 1.2–3.4)
LYMPHOCYTES # BLD AUTO: 18.1 % — LOW (ref 20.5–51.1)
MAGNESIUM SERPL-MCNC: 1.9 MG/DL — SIGNIFICANT CHANGE UP (ref 1.8–2.4)
MCHC RBC-ENTMCNC: 30.7 PG — SIGNIFICANT CHANGE UP (ref 27–31)
MCHC RBC-ENTMCNC: 34 G/DL — SIGNIFICANT CHANGE UP (ref 32–37)
MCV RBC AUTO: 90.3 FL — SIGNIFICANT CHANGE UP (ref 81–99)
MONOCYTES # BLD AUTO: 0.85 K/UL — HIGH (ref 0.1–0.6)
MONOCYTES NFR BLD AUTO: 8.8 % — SIGNIFICANT CHANGE UP (ref 1.7–9.3)
NEUTROPHILS # BLD AUTO: 6.84 K/UL — HIGH (ref 1.4–6.5)
NEUTROPHILS NFR BLD AUTO: 71.2 % — SIGNIFICANT CHANGE UP (ref 42.2–75.2)
NRBC # BLD: 0 /100 WBCS — SIGNIFICANT CHANGE UP (ref 0–0)
PLATELET # BLD AUTO: 241 K/UL — SIGNIFICANT CHANGE UP (ref 130–400)
PMV BLD: 10.4 FL — SIGNIFICANT CHANGE UP (ref 7.4–10.4)
POTASSIUM SERPL-MCNC: 3.8 MMOL/L — SIGNIFICANT CHANGE UP (ref 3.5–5)
POTASSIUM SERPL-SCNC: 3.8 MMOL/L — SIGNIFICANT CHANGE UP (ref 3.5–5)
PROT SERPL-MCNC: 5.7 G/DL — LOW (ref 6–8)
RBC # BLD: 3.71 M/UL — LOW (ref 4.2–5.4)
RBC # FLD: 12.6 % — SIGNIFICANT CHANGE UP (ref 11.5–14.5)
SODIUM SERPL-SCNC: 135 MMOL/L — SIGNIFICANT CHANGE UP (ref 135–146)
WBC # BLD: 9.62 K/UL — SIGNIFICANT CHANGE UP (ref 4.8–10.8)
WBC # FLD AUTO: 9.62 K/UL — SIGNIFICANT CHANGE UP (ref 4.8–10.8)

## 2023-07-02 PROCEDURE — 74183 MRI ABD W/O CNTR FLWD CNTR: CPT | Mod: 26

## 2023-07-02 PROCEDURE — 99233 SBSQ HOSP IP/OBS HIGH 50: CPT

## 2023-07-02 PROCEDURE — 99232 SBSQ HOSP IP/OBS MODERATE 35: CPT | Mod: 24,25

## 2023-07-02 RX ORDER — SODIUM CHLORIDE 9 MG/ML
1000 INJECTION, SOLUTION INTRAVENOUS
Refills: 0 | Status: DISCONTINUED | OUTPATIENT
Start: 2023-07-02 | End: 2023-07-03

## 2023-07-02 RX ADMIN — SODIUM CHLORIDE 250 MILLILITER(S): 9 INJECTION, SOLUTION INTRAVENOUS at 05:28

## 2023-07-02 RX ADMIN — Medication 100 MILLIGRAM(S): at 05:28

## 2023-07-02 RX ADMIN — CEFTRIAXONE 100 MILLIGRAM(S): 500 INJECTION, POWDER, FOR SOLUTION INTRAMUSCULAR; INTRAVENOUS at 21:21

## 2023-07-02 RX ADMIN — ENOXAPARIN SODIUM 40 MILLIGRAM(S): 100 INJECTION SUBCUTANEOUS at 13:46

## 2023-07-02 RX ADMIN — Medication 100 MILLIGRAM(S): at 13:46

## 2023-07-02 RX ADMIN — Medication 100 MILLIGRAM(S): at 21:21

## 2023-07-02 RX ADMIN — CHLORHEXIDINE GLUCONATE 1 APPLICATION(S): 213 SOLUTION TOPICAL at 11:51

## 2023-07-02 NOTE — PROGRESS NOTE ADULT - ASSESSMENT
37 year-old Female with past medical history of pancreatitis and gallstones presents with complaint of abdominal pain.  Patient states abdominal pain has been present for 3 days. Patient is being admitted to the ICU for gallstone pancreatitis     # Gall stone pancreatitis (Second episode)   Was hospitalized from 05/05/23 to 05/08/2023, for similar symptoms and was diagnosed with Gallstone Pancreatitis. She had EUS on 5/8 which showed no cbd stone, cbd 7mm and was discharged with plan for cholecystectomy as OP.   This admission: patient is tachycardic,  Lipase >3000 ALK Phos 199/ / / TB 1.8/ . CT AP showed Peripancreatic fat stranding without discrete fluid collection, suggesting mild pancreatitis. US showed Echogenic liver compatible with hepatic steatosis. Cholelithiasis and biliary sludge. Mild wall thickening and surrounding inflammatory changes.     Today   tachycardia resolved   LFT downtrending  Symptoms resolved   No fever no leukocytosis     PLAN   CLD    cc/ Hour   MICU monitoring   Surgery Follow up. Recommend MRCP if negative would recommend Cholecystectomy  before discharge since patient is high risk for recurrent gall stone pancreatitis.   LFTs improving. Will consider ERCP in case of worsening LFTs or any evidence of impacted stones.   Supportive care per MICU team   Pain control     # Hepatic steatosis   - Follow up with our Hepatology Clinic located at [address] [place] [address]. Phone Number: [phone]

## 2023-07-02 NOTE — PROGRESS NOTE ADULT - SUBJECTIVE AND OBJECTIVE BOX
Gastroenterology progress note:     Patient is a 37y old  Female who presents with a chief complaint of Gallstone Pancreatitis (02 Jul 2023 11:29)       Admitted on: 06-30-23    We are following the patient for pancreatitis    pain resolved   no overnight events   denies any symptoms in am     PAST MEDICAL & SURGICAL HISTORY:  Pancreatitis          MEDICATIONS  (STANDING):  cefTRIAXone   IVPB 2000 milliGRAM(s) IV Intermittent every 24 hours  chlorhexidine 2% Cloths 1 Application(s) Topical daily  enoxaparin Injectable 40 milliGRAM(s) SubCutaneous every 24 hours  lactated ringers. 1000 milliLiter(s) (150 mL/Hr) IV Continuous <Continuous>  metroNIDAZOLE  IVPB 500 milliGRAM(s) IV Intermittent every 8 hours    MEDICATIONS  (PRN):  ketorolac   Injectable 30 milliGRAM(s) IV Push every 6 hours PRN Severe Pain (7 - 10)  ondansetron Injectable 4 milliGRAM(s) IV Push every 8 hours PRN Nausea and/or Vomiting      Allergies  No Known Allergies      Review of Systems:   Cardiovascular:  No Chest Pain, No Palpitations  Respiratory:  No Cough, No Dyspnea  Gastrointestinal:  As described in HPI  Skin:  No Skin Lesions, No Jaundice  Neuro:  No Syncope, No Dizziness    Physical Examination:  T(C): 37.5 (07-02-23 @ 16:00), Max: 37.5 (07-02-23 @ 16:00)  HR: 106 (07-02-23 @ 16:00) (96 - 106)  BP: 150/71 (07-02-23 @ 16:00) (117/58 - 150/71)  RR: 18 (07-02-23 @ 16:00) (18 - 20)  SpO2: 96% (07-02-23 @ 10:15) (94% - 97%)      07-01-23 @ 07:01  -  07-02-23 @ 07:00  --------------------------------------------------------  IN: 1600 mL / OUT: 400 mL / NET: 1200 mL    07-02-23 @ 07:01  -  07-02-23 @ 18:13  --------------------------------------------------------  IN: 2900 mL / OUT: 0 mL / NET: 2900 mL        GENERAL: AAOx3, no acute distress.  HEAD:  Atraumatic, Normocephalic  EYES: conjunctiva and sclera clear  NECK: Supple, no JVD or thyromegaly  CHEST/LUNG: Clear to auscultation bilaterally; No wheeze, rhonchi, or rales  HEART: Regular rate and rhythm; normal S1, S2, No murmurs.  ABDOMEN: Soft, nontender, nondistended; Bowel sounds present  NEUROLOGY: No asterixis or tremor.   SKIN: Intact, no jaundice     Data:                        11.4   9.62  )-----------( 241      ( 02 Jul 2023 07:07 )             33.5     Hgb trend:  11.4  07-02-23 @ 07:07  11.7  07-01-23 @ 12:38  12.1  07-01-23 @ 05:09  15.6  06-30-23 @ 14:25        07-02    135  |  101  |  4<L>  ----------------------------<  77  3.8   |  20  |  0.5<L>    Ca    7.7<L>      02 Jul 2023 07:07  Mg     1.9     07-02    TPro  5.7<L>  /  Alb  3.3<L>  /  TBili  0.8  /  DBili  x   /  AST  43<H>  /  ALT  219<H>  /  AlkPhos  113  07-02    Liver panel trend:  TBili 0.8   /   AST 43   /      /   AlkP 113   /   Tptn 5.7   /   Alb 3.3    /   DBili --      07-02  TBili 1.4   /   AST 99   /      /   AlkP 136   /   Tptn 6.1   /   Alb 3.6    /   DBili 0.6      07-01  TBili 1.8   /      /      /   AlkP 199   /   Tptn 7.7   /   Alb 4.5    /   DBili 1.0      06-30          Culture - Blood (collected 01 Jul 2023 05:09)  Source: .Blood None  Preliminary Report (02 Jul 2023 17:01):    No growth at 24 hours    Culture - Blood (collected 01 Jul 2023 01:10)  Source: .Blood Blood-Peripheral  Preliminary Report (02 Jul 2023 07:02):    No growth at 24 hours         Radiology:

## 2023-07-02 NOTE — PROGRESS NOTE ADULT - SUBJECTIVE AND OBJECTIVE BOX
GENERAL SURGERY PROGRESS NOTE     KATI RAYMUNDO  Female  Hospital day: 3d    OVERNIGHT EVENTS: Stable abdominal pain. Continues NPO w/ IVF. No n/v.     T(F): 99.1 (07-02-23 @ 08:36), Max: 100.3 (07-01-23 @ 13:00)  HR: 104 (07-02-23 @ 08:36) (100 - 121)  BP: 117/58 (07-02-23 @ 08:36) (110/54 - 134/74)  RR: 20 (07-02-23 @ 08:36) (18 - 32)  SpO2: 97% (07-02-23 @ 08:36) (94% - 97%)    DIET/FLUIDS: lactated ringers. 1000 milliLiter(s) IV Continuous <Continuous>     GI proph:    AC/ proph: enoxaparin Injectable 40 milliGRAM(s) SubCutaneous every 24 hours    ABx: cefTRIAXone   IVPB 2000 milliGRAM(s) IV Intermittent every 24 hours  metroNIDAZOLE  IVPB 500 milliGRAM(s) IV Intermittent every 8 hours    PHYSICAL EXAM:  GENERAL: NAD, well-appearing  CHEST/LUNG: Breathing comfortably   HEART: Regular rate   ABDOMEN: Soft, Nondistended; Mild diffuse tenderness to palpation    EXTREMITIES:  No edema      LABS:              11.4   9.62  )-----------( 241      ( 02 Jul 2023 07:07 )             33.5       Auto Neutrophil %: 71.2 % (07-02-23 @ 07:07)  Auto Immature Granulocyte %: 0.5 % (07-02-23 @ 07:07)    07-01    138  |  104  |  7<L>  ----------------------------<  105<H>  3.6   |  20  |  0.6<L>      LFTs:             6.1  | 1.4  | 99       ------------------[136     ( 01 Jul 2023 05:09 )  3.6  | 0.6  | 378         Lipase:659        Lactate, Blood: 1.0 mmol/L (07-01-23 @ 05:09)  Lactate, Blood: 1.3 mmol/L (06-30-23 @ 14:25)      Culture - Blood (collected 01 Jul 2023 01:10)  Source: .Blood Blood-Peripheral  Preliminary Report (02 Jul 2023 07:02):    No growth at 24 hours GENERAL SURGERY PROGRESS NOTE     KATI RAYMUNDO  Female  Hospital day: 3d    OVERNIGHT EVENTS: Stable abdominal pain. Continues NPO w/ IVF. No n/v.     Patient seen and examined during AM rounds.  ID [id number]    T(F): 99.1 (07-02-23 @ 08:36), Max: 100.3 (07-01-23 @ 13:00)  HR: 104 (07-02-23 @ 08:36) (100 - 121)  BP: 117/58 (07-02-23 @ 08:36) (110/54 - 134/74)  RR: 20 (07-02-23 @ 08:36) (18 - 32)  SpO2: 97% (07-02-23 @ 08:36) (94% - 97%)    DIET/FLUIDS: lactated ringers. 1000 milliLiter(s) IV Continuous <Continuous>     GI proph:    AC/ proph: enoxaparin Injectable 40 milliGRAM(s) SubCutaneous every 24 hours    ABx: cefTRIAXone   IVPB 2000 milliGRAM(s) IV Intermittent every 24 hours  metroNIDAZOLE  IVPB 500 milliGRAM(s) IV Intermittent every 8 hours    PHYSICAL EXAM:  GENERAL: NAD, well-appearing  CHEST/LUNG: Breathing comfortably   HEART: Regular rate   ABDOMEN: Soft, Nondistended; Mild diffuse tenderness to palpation    EXTREMITIES:  No edema      LABS:              11.4   9.62  )-----------( 241      ( 02 Jul 2023 07:07 )             33.5       Auto Neutrophil %: 71.2 % (07-02-23 @ 07:07)  Auto Immature Granulocyte %: 0.5 % (07-02-23 @ 07:07)    07-01    138  |  104  |  7<L>  ----------------------------<  105<H>  3.6   |  20  |  0.6<L>      LFTs:             6.1  | 1.4  | 99       ------------------[136     ( 01 Jul 2023 05:09 )  3.6  | 0.6  | 378         Lipase:659        Lactate, Blood: 1.0 mmol/L (07-01-23 @ 05:09)  Lactate, Blood: 1.3 mmol/L (06-30-23 @ 14:25)      Culture - Blood (collected 01 Jul 2023 01:10)  Source: .Blood Blood-Peripheral  Preliminary Report (02 Jul 2023 07:02):    No growth at 24 hours

## 2023-07-02 NOTE — PROGRESS NOTE ADULT - ASSESSMENT
ASSESSMENT:   36 y/o F w/ PMHx of pancreatitis and gallstones. Presented to ED on 6/30/23 with abdominal pain and diagnosed with gallstone pancreatitis.     PLAN:   - Advance to clear liquid diet today -- will consult with Medicine team  - Will f/u with GI regarding possible ERCP vs. MRCP   - LFTs downtrending -- continue to trend   - Continue with ceftriaxone and Flagyl   - F/u blood cultures     spec [id number]   ASSESSMENT:   38 y/o F w/ PMHx of pancreatitis and gallstones. Presented to ED on 6/30/23 with abdominal pain and diagnosed with gallstone pancreatitis.     PLAN:   - Recommend advance to clear liquid diet today  - F/u with advanced GI for MRCP   - LFTs downtrending -- continue to trend   - Continue with ceftriaxone and Flagyl   - F/u blood cultures   - Will continue to follow for possible lap zay    spec [id number]

## 2023-07-02 NOTE — PROGRESS NOTE ADULT - SUBJECTIVE AND OBJECTIVE BOX
Progress Note:  Provider Speciality                            Hospitalist      KATI RAYMUNDO MRN-[id number] 37y Female     CHIEF PRESENTING COMPLAINT:  Patient is a 37y old  Female who presents with a chief complaint of Gallstone Pancreatitis (02 Jul 2023 09:09)        SUBJECTIVE:  Patient was seen and examined at bedside.  no complaint of epi pain  No significant overnight events reported.     HISTORY OF PRESENTING ILLNESS:  HPI:  37 year-old Female with past medical history of pancreatitis and gallstones presents with complaint of abdominal pain.  Patient states abdominal pain has been present for 3 days. She describes the pain as sharp localized to the epigastric region as well as the left upper quadrant. She stated that the pain after she had peach.  She endorses that the pain occasionally radiates to the back between the shoulder blades.  She states that the pain has been associated with 4 episodes of nonbilious nonbloody vomiting yesterday and 4 episodes today.  Patient states pain is worse with meals, comes and goes. She initially stated that she thought it was food poisoning but since her symptoms did not resolve she decided to come to the ED. Denies any urinary symptoms.   Denies fever/chills, chest pain, shortness of breath, diarrhea, lightheadedness/weakness, numbness/tingling. States that this feels different from her previous episode of pancreatitis as the pain was more right sided at that time.    Of note patient was hospitalized from 05/05/23 to 05/08/2023, for similar symptoms and was diagnosed with Gallstone Pancreatitis. She had ERCP/EUS on 5/8 which showed no cbd stone, cbd 7mm and was discharged with plan for cholecystectomy as OP.    In ED:   VS were /81, . RR 18, T 97.8, SPO2 95% on RA.   Labs were  significant for:   WBC of 14k , AG 17  UA positive for large ketones, nitrites, LE and bacteria  Lipase >3000   ALK Phos 199/ / / TB 1.8/ DB 1    EKG showed Sinus tachycardia     CXR unremarkable  CT AP showed1.  Peripancreatic fat stranding without discrete fluid collection, suggesting mild pancreatitis. 2.  there are inflammatory changes surrounding the mid descending colon   with a fat-containing appendage contiguous to it consistent with epiploic appendicitis    US showed Echogenic liver compatible with hepatic steatosis. Cholelithiasis and biliary sludge. Mild wall thickening and surrounding inflammatory changes. Findings suggesting acute cholecystitis.  Patient received ceftriaxone and flagyl x1, 1L LR bolus     Patient is being admitted to the ICU for gallstone pancreatitis    (30 Jun 2023 20:19)        REVIEW OF SYSTEMS:  Patient denies  headache, fever, chills. Denies chest pain, shortness of breath, palpitation. Denies nausea, vomiting, abdominal pain or diarrhoea, Denies dysuria.   At least 10 systems were reviewed in ROS. All systems reviewed  are within normal limits except for the complaints as described in Subjective.    PAST MEDICAL & SURGICAL HISTORY:  PAST MEDICAL & SURGICAL HISTORY:  Pancreatitis              VITAL SIGNS:  Vital Signs Last 24 Hrs  T(C): 37.3 (02 Jul 2023 08:36), Max: 37.9 (01 Jul 2023 13:00)  T(F): 99.1 (02 Jul 2023 08:36), Max: 100.3 (01 Jul 2023 13:00)  HR: 96 (02 Jul 2023 10:15) (96 - 115)  BP: 117/58 (02 Jul 2023 08:36) (110/54 - 129/68)  BP(mean): 76 (01 Jul 2023 13:00) (76 - 85)  RR: 18 (02 Jul 2023 10:15) (18 - 30)  SpO2: 96% (02 Jul 2023 10:15) (94% - 97%)    Parameters below as of 02 Jul 2023 10:15  Patient On (Oxygen Delivery Method): room air              PHYSICAL EXAMINATION:  Not in acute distress, obese  General: No icterus  HEENT:   no JVD.  Heart: S1+S2 audible  Lungs: bilateral  moderate air entry, no wheezing, no crepitations.  Abdomen: Soft, non-tender, non-distended , no  rigidity or guarding.  CNS: Awake alert, CN  grossly intact.  Extremities:  No edema            CONSULTS:  Consultant(s) Notes Reviewed by me.   Care Discussed with Consultants/Other Providers where required.    All the images and labs were reviewed today        MEDICATIONS:  MEDICATIONS  (STANDING):  cefTRIAXone   IVPB 2000 milliGRAM(s) IV Intermittent every 24 hours  chlorhexidine 2% Cloths 1 Application(s) Topical daily  enoxaparin Injectable 40 milliGRAM(s) SubCutaneous every 24 hours  lactated ringers. 1000 milliLiter(s) (250 mL/Hr) IV Continuous <Continuous>  metroNIDAZOLE  IVPB 500 milliGRAM(s) IV Intermittent every 8 hours    MEDICATIONS  (PRN):  ketorolac   Injectable 30 milliGRAM(s) IV Push every 6 hours PRN Severe Pain (7 - 10)  ondansetron Injectable 4 milliGRAM(s) IV Push every 8 hours PRN Nausea and/or Vomiting

## 2023-07-02 NOTE — PROGRESS NOTE ADULT - ASSESSMENT
37 year-old Female with past medical history of pancreatitis and gallstones presents with complaint of abdominal pain.  Found to have gallstone pancreatitis.      ·	Gallstone pancreatitis   ·	Class ii obesity , BMI 38      ·	Will get MRCP. CT consistent with pancreatitis , hepatic steatosis  ·	LFTS downtrending  ·	continue with ceftriaxone and flagyl. Possible acute cystitis vs just pyuria as no dysuria, UA positive( pt on Rocephin anyway)  ·	Get Hb , lipid panel  ·	Lipase 3K to 600  ·	Plan for CCY after MRCp next week  ·	Started on clears today  ·	Expected inpatient care exceeds 48 hrs. pending MRCP, CCY     37 year-old Female with past medical history of pancreatitis and gallstones presents with complaint of abdominal pain.  Found to have gallstone pancreatitis.      ·	Gallstone pancreatitis   ·	Class ii obesity , BMI 38      ·	Will get MRCP. CT consistent with pancreatitis , hepatic steatosis  ·	LFTS downtrending. Reduce IVF rate to 150 ml/hr  ·	continue with ceftriaxone and flagyl. Possible acute cystitis vs just pyuria as no dysuria, UA positive( pt on Rocephin anyway)  ·	Get Hb , lipid panel  ·	Lipase 3K to 600  ·	Plan for CCY after MRCp next week  ·	Started on clears today  ·	Expected inpatient care exceeds 48 hrs. pending MRCP, CCY

## 2023-07-03 LAB
A1C WITH ESTIMATED AVERAGE GLUCOSE RESULT: 6.1 % — HIGH (ref 4–5.6)
ALBUMIN SERPL ELPH-MCNC: 3.5 G/DL — SIGNIFICANT CHANGE UP (ref 3.5–5.2)
ALBUMIN SERPL ELPH-MCNC: 3.5 G/DL — SIGNIFICANT CHANGE UP (ref 3.5–5.2)
ALP SERPL-CCNC: 100 U/L — SIGNIFICANT CHANGE UP (ref 30–115)
ALP SERPL-CCNC: 115 U/L — SIGNIFICANT CHANGE UP (ref 30–115)
ALT FLD-CCNC: 127 U/L — HIGH (ref 0–41)
ALT FLD-CCNC: 164 U/L — HIGH (ref 0–41)
ANION GAP SERPL CALC-SCNC: 13 MMOL/L — SIGNIFICANT CHANGE UP (ref 7–14)
ANION GAP SERPL CALC-SCNC: 14 MMOL/L — SIGNIFICANT CHANGE UP (ref 7–14)
AST SERPL-CCNC: 21 U/L — SIGNIFICANT CHANGE UP (ref 0–41)
AST SERPL-CCNC: 27 U/L — SIGNIFICANT CHANGE UP (ref 0–41)
BASOPHILS # BLD AUTO: 0.02 K/UL — SIGNIFICANT CHANGE UP (ref 0–0.2)
BASOPHILS # BLD AUTO: 0.03 K/UL — SIGNIFICANT CHANGE UP (ref 0–0.2)
BASOPHILS NFR BLD AUTO: 0.3 % — SIGNIFICANT CHANGE UP (ref 0–1)
BASOPHILS NFR BLD AUTO: 0.4 % — SIGNIFICANT CHANGE UP (ref 0–1)
BILIRUB DIRECT SERPL-MCNC: 0.2 MG/DL — SIGNIFICANT CHANGE UP (ref 0–0.3)
BILIRUB INDIRECT FLD-MCNC: 0.2 MG/DL — SIGNIFICANT CHANGE UP (ref 0.2–1.2)
BILIRUB SERPL-MCNC: 0.4 MG/DL — SIGNIFICANT CHANGE UP (ref 0.2–1.2)
BILIRUB SERPL-MCNC: 0.5 MG/DL — SIGNIFICANT CHANGE UP (ref 0.2–1.2)
BUN SERPL-MCNC: 4 MG/DL — LOW (ref 10–20)
BUN SERPL-MCNC: 4 MG/DL — LOW (ref 10–20)
CA-I BLD-SCNC: 4.6 MG/DL — SIGNIFICANT CHANGE UP (ref 4.5–5.6)
CALCIUM SERPL-MCNC: 8.3 MG/DL — LOW (ref 8.4–10.5)
CALCIUM SERPL-MCNC: 8.3 MG/DL — LOW (ref 8.4–10.5)
CHLORIDE SERPL-SCNC: 101 MMOL/L — SIGNIFICANT CHANGE UP (ref 98–110)
CHLORIDE SERPL-SCNC: 103 MMOL/L — SIGNIFICANT CHANGE UP (ref 98–110)
CHOLEST SERPL-MCNC: 172 MG/DL — SIGNIFICANT CHANGE UP
CO2 SERPL-SCNC: 21 MMOL/L — SIGNIFICANT CHANGE UP (ref 17–32)
CO2 SERPL-SCNC: 22 MMOL/L — SIGNIFICANT CHANGE UP (ref 17–32)
CREAT SERPL-MCNC: 0.5 MG/DL — LOW (ref 0.7–1.5)
CREAT SERPL-MCNC: 0.5 MG/DL — LOW (ref 0.7–1.5)
EGFR: 124 ML/MIN/1.73M2 — SIGNIFICANT CHANGE UP
EGFR: 124 ML/MIN/1.73M2 — SIGNIFICANT CHANGE UP
EOSINOPHIL # BLD AUTO: 0.18 K/UL — SIGNIFICANT CHANGE UP (ref 0–0.7)
EOSINOPHIL # BLD AUTO: 0.22 K/UL — SIGNIFICANT CHANGE UP (ref 0–0.7)
EOSINOPHIL NFR BLD AUTO: 2.3 % — SIGNIFICANT CHANGE UP (ref 0–8)
EOSINOPHIL NFR BLD AUTO: 3.3 % — SIGNIFICANT CHANGE UP (ref 0–8)
ESTIMATED AVERAGE GLUCOSE: 128 MG/DL — HIGH (ref 68–114)
GLUCOSE SERPL-MCNC: 129 MG/DL — HIGH (ref 70–99)
GLUCOSE SERPL-MCNC: 80 MG/DL — SIGNIFICANT CHANGE UP (ref 70–99)
HCT VFR BLD CALC: 33.7 % — LOW (ref 37–47)
HCT VFR BLD CALC: 35.8 % — LOW (ref 37–47)
HDLC SERPL-MCNC: 28 MG/DL — LOW
HGB BLD-MCNC: 11.3 G/DL — LOW (ref 12–16)
HGB BLD-MCNC: 12.4 G/DL — SIGNIFICANT CHANGE UP (ref 12–16)
IMM GRANULOCYTES NFR BLD AUTO: 0.4 % — HIGH (ref 0.1–0.3)
IMM GRANULOCYTES NFR BLD AUTO: 0.4 % — HIGH (ref 0.1–0.3)
LIPID PNL WITH DIRECT LDL SERPL: 121 MG/DL — HIGH
LYMPHOCYTES # BLD AUTO: 1.82 K/UL — SIGNIFICANT CHANGE UP (ref 1.2–3.4)
LYMPHOCYTES # BLD AUTO: 2 K/UL — SIGNIFICANT CHANGE UP (ref 1.2–3.4)
LYMPHOCYTES # BLD AUTO: 23 % — SIGNIFICANT CHANGE UP (ref 20.5–51.1)
LYMPHOCYTES # BLD AUTO: 29.9 % — SIGNIFICANT CHANGE UP (ref 20.5–51.1)
MAGNESIUM SERPL-MCNC: 2 MG/DL — SIGNIFICANT CHANGE UP (ref 1.8–2.4)
MAGNESIUM SERPL-MCNC: 2 MG/DL — SIGNIFICANT CHANGE UP (ref 1.8–2.4)
MCHC RBC-ENTMCNC: 29.8 PG — SIGNIFICANT CHANGE UP (ref 27–31)
MCHC RBC-ENTMCNC: 31 PG — SIGNIFICANT CHANGE UP (ref 27–31)
MCHC RBC-ENTMCNC: 33.5 G/DL — SIGNIFICANT CHANGE UP (ref 32–37)
MCHC RBC-ENTMCNC: 34.6 G/DL — SIGNIFICANT CHANGE UP (ref 32–37)
MCV RBC AUTO: 88.9 FL — SIGNIFICANT CHANGE UP (ref 81–99)
MCV RBC AUTO: 89.5 FL — SIGNIFICANT CHANGE UP (ref 81–99)
MONOCYTES # BLD AUTO: 0.67 K/UL — HIGH (ref 0.1–0.6)
MONOCYTES # BLD AUTO: 0.7 K/UL — HIGH (ref 0.1–0.6)
MONOCYTES NFR BLD AUTO: 10 % — HIGH (ref 1.7–9.3)
MONOCYTES NFR BLD AUTO: 8.9 % — SIGNIFICANT CHANGE UP (ref 1.7–9.3)
NEUTROPHILS # BLD AUTO: 3.73 K/UL — SIGNIFICANT CHANGE UP (ref 1.4–6.5)
NEUTROPHILS # BLD AUTO: 5.15 K/UL — SIGNIFICANT CHANGE UP (ref 1.4–6.5)
NEUTROPHILS NFR BLD AUTO: 56 % — SIGNIFICANT CHANGE UP (ref 42.2–75.2)
NEUTROPHILS NFR BLD AUTO: 65.1 % — SIGNIFICANT CHANGE UP (ref 42.2–75.2)
NON HDL CHOLESTEROL: 144 MG/DL — HIGH
NRBC # BLD: 0 /100 WBCS — SIGNIFICANT CHANGE UP (ref 0–0)
NRBC # BLD: 0 /100 WBCS — SIGNIFICANT CHANGE UP (ref 0–0)
PHOSPHATE SERPL-MCNC: 2.7 MG/DL — SIGNIFICANT CHANGE UP (ref 2.1–4.9)
PLATELET # BLD AUTO: 290 K/UL — SIGNIFICANT CHANGE UP (ref 130–400)
PLATELET # BLD AUTO: 300 K/UL — SIGNIFICANT CHANGE UP (ref 130–400)
PMV BLD: 10.1 FL — SIGNIFICANT CHANGE UP (ref 7.4–10.4)
PMV BLD: 10.3 FL — SIGNIFICANT CHANGE UP (ref 7.4–10.4)
POTASSIUM SERPL-MCNC: 3.7 MMOL/L — SIGNIFICANT CHANGE UP (ref 3.5–5)
POTASSIUM SERPL-MCNC: 3.9 MMOL/L — SIGNIFICANT CHANGE UP (ref 3.5–5)
POTASSIUM SERPL-SCNC: 3.7 MMOL/L — SIGNIFICANT CHANGE UP (ref 3.5–5)
POTASSIUM SERPL-SCNC: 3.9 MMOL/L — SIGNIFICANT CHANGE UP (ref 3.5–5)
PROT SERPL-MCNC: 5.9 G/DL — LOW (ref 6–8)
PROT SERPL-MCNC: 6.3 G/DL — SIGNIFICANT CHANGE UP (ref 6–8)
RBC # BLD: 3.79 M/UL — LOW (ref 4.2–5.4)
RBC # BLD: 4 M/UL — LOW (ref 4.2–5.4)
RBC # FLD: 12.4 % — SIGNIFICANT CHANGE UP (ref 11.5–14.5)
RBC # FLD: 12.5 % — SIGNIFICANT CHANGE UP (ref 11.5–14.5)
SODIUM SERPL-SCNC: 136 MMOL/L — SIGNIFICANT CHANGE UP (ref 135–146)
SODIUM SERPL-SCNC: 138 MMOL/L — SIGNIFICANT CHANGE UP (ref 135–146)
TRIGL SERPL-MCNC: 113 MG/DL — SIGNIFICANT CHANGE UP
WBC # BLD: 6.68 K/UL — SIGNIFICANT CHANGE UP (ref 4.8–10.8)
WBC # BLD: 7.9 K/UL — SIGNIFICANT CHANGE UP (ref 4.8–10.8)
WBC # FLD AUTO: 6.68 K/UL — SIGNIFICANT CHANGE UP (ref 4.8–10.8)
WBC # FLD AUTO: 7.9 K/UL — SIGNIFICANT CHANGE UP (ref 4.8–10.8)

## 2023-07-03 PROCEDURE — 99233 SBSQ HOSP IP/OBS HIGH 50: CPT

## 2023-07-03 PROCEDURE — 99232 SBSQ HOSP IP/OBS MODERATE 35: CPT

## 2023-07-03 RX ORDER — SODIUM CHLORIDE 9 MG/ML
1000 INJECTION, SOLUTION INTRAVENOUS
Refills: 0 | Status: DISCONTINUED | OUTPATIENT
Start: 2023-07-03 | End: 2023-07-04

## 2023-07-03 RX ADMIN — ENOXAPARIN SODIUM 40 MILLIGRAM(S): 100 INJECTION SUBCUTANEOUS at 14:15

## 2023-07-03 RX ADMIN — Medication 100 MILLIGRAM(S): at 05:25

## 2023-07-03 RX ADMIN — Medication 100 MILLIGRAM(S): at 14:15

## 2023-07-03 RX ADMIN — CEFTRIAXONE 100 MILLIGRAM(S): 500 INJECTION, POWDER, FOR SOLUTION INTRAMUSCULAR; INTRAVENOUS at 21:28

## 2023-07-03 RX ADMIN — CHLORHEXIDINE GLUCONATE 1 APPLICATION(S): 213 SOLUTION TOPICAL at 11:33

## 2023-07-03 RX ADMIN — Medication 100 MILLIGRAM(S): at 21:28

## 2023-07-03 NOTE — PROGRESS NOTE ADULT - SUBJECTIVE AND OBJECTIVE BOX
Gastroenterology progress note:     Patient is a 37y old  Female who presents with a chief complaint of Gallstone Pancreatitis (02 Jul 2023 18:12)       Admitted on: 06-30-23    We are following the patient for pancreatitis      Interval History:  denies any abdominal pain, nausea, vomiting   tolerating liquids        PAST MEDICAL & SURGICAL HISTORY:  Pancreatitis          MEDICATIONS  (STANDING):  cefTRIAXone   IVPB 2000 milliGRAM(s) IV Intermittent every 24 hours  chlorhexidine 2% Cloths 1 Application(s) Topical daily  enoxaparin Injectable 40 milliGRAM(s) SubCutaneous every 24 hours  lactated ringers. 1000 milliLiter(s) (150 mL/Hr) IV Continuous <Continuous>  metroNIDAZOLE  IVPB 500 milliGRAM(s) IV Intermittent every 8 hours    MEDICATIONS  (PRN):  ketorolac   Injectable 30 milliGRAM(s) IV Push every 6 hours PRN Severe Pain (7 - 10)  ondansetron Injectable 4 milliGRAM(s) IV Push every 8 hours PRN Nausea and/or Vomiting      Allergies  No Known Allergies      Review of Systems:   Cardiovascular:  No Chest Pain, No Palpitations  Respiratory:  No Cough, No Dyspnea  Gastrointestinal:  As described in HPI    Physical Examination:  T(C): 36.6 (07-03-23 @ 08:11), Max: 37.5 (07-02-23 @ 16:00)  HR: 89 (07-03-23 @ 08:11) (89 - 106)  BP: 132/71 (07-03-23 @ 08:11) (132/71 - 150/71)  RR: 18 (07-03-23 @ 08:11) (18 - 18)  SpO2: 96% (07-02-23 @ 10:15) (96% - 96%)      07-02-23 @ 07:01  -  07-03-23 @ 07:00  --------------------------------------------------------  IN: 4700 mL / OUT: 0 mL / NET: 4700 mL      Constitutional: No acute distress.  Respiratory:  No signs of respiratory distress. Lung sounds are clear bilaterally.  Cardiovascular:  S1 S2, Regular rate and rhythm.  Abdominal: Abdomen is soft, symmetric, and non-tender without distention.     Data:                        12.4   7.90  )-----------( 290      ( 03 Jul 2023 08:21 )             35.8     Hgb trend:  12.4  07-03-23 @ 08:21  11.4  07-02-23 @ 07:07  11.7  07-01-23 @ 12:38  12.1  07-01-23 @ 05:09  15.6  06-30-23 @ 14:25        07-03    138  |  103  |  4<L>  ----------------------------<  80  3.9   |  21  |  0.5<L>    Ca    8.3<L>      03 Jul 2023 08:21  Mg     2.0     07-03    TPro  6.3  /  Alb  3.5  /  TBili  0.5  /  DBili  x   /  AST  27  /  ALT  164<H>  /  AlkPhos  115  07-03    Liver panel trend:  TBili 0.5   /   AST 27   /      /   AlkP 115   /   Tptn 6.3   /   Alb 3.5    /   DBili --      07-03  TBili 0.8   /   AST 43   /      /   AlkP 113   /   Tptn 5.7   /   Alb 3.3    /   DBili --      07-02  TBili 1.4   /   AST 99   /      /   AlkP 136   /   Tptn 6.1   /   Alb 3.6    /   DBili 0.6      07-01  TBili 1.8   /      /      /   AlkP 199   /   Tptn 7.7   /   Alb 4.5    /   DBili 1.0      06-30          Culture - Blood (collected 01 Jul 2023 05:09)  Source: .Blood None  Preliminary Report (02 Jul 2023 17:01):    No growth at 24 hours    Culture - Blood (collected 01 Jul 2023 01:10)  Source: .Blood Blood-Peripheral  Preliminary Report (03 Jul 2023 07:01):    No growth at 48 Hours         Radiology:       Gastroenterology progress note:     Patient is a 37y old  Female who presents with a chief complaint of Gallstone Pancreatitis (02 Jul 2023 18:12)       Admitted on: 06-30-23    We are following the patient for pancreatitis      Interval History:  denies any abdominal pain, nausea, vomiting   tolerating liquids        PAST MEDICAL & SURGICAL HISTORY:  Pancreatitis          MEDICATIONS  (STANDING):  cefTRIAXone   IVPB 2000 milliGRAM(s) IV Intermittent every 24 hours  chlorhexidine 2% Cloths 1 Application(s) Topical daily  enoxaparin Injectable 40 milliGRAM(s) SubCutaneous every 24 hours  lactated ringers. 1000 milliLiter(s) (150 mL/Hr) IV Continuous <Continuous>  metroNIDAZOLE  IVPB 500 milliGRAM(s) IV Intermittent every 8 hours    MEDICATIONS  (PRN):  ketorolac   Injectable 30 milliGRAM(s) IV Push every 6 hours PRN Severe Pain (7 - 10)  ondansetron Injectable 4 milliGRAM(s) IV Push every 8 hours PRN Nausea and/or Vomiting      Allergies  No Known Allergies      Review of Systems:   Cardiovascular:  No Chest Pain, No Palpitations  Respiratory:  No Cough, No Dyspnea  Gastrointestinal:  As described in HPI    Physical Examination:  T(C): 36.6 (07-03-23 @ 08:11), Max: 37.5 (07-02-23 @ 16:00)  HR: 89 (07-03-23 @ 08:11) (89 - 106)  BP: 132/71 (07-03-23 @ 08:11) (132/71 - 150/71)  RR: 18 (07-03-23 @ 08:11) (18 - 18)  SpO2: 96% (07-02-23 @ 10:15) (96% - 96%)      07-02-23 @ 07:01  -  07-03-23 @ 07:00  --------------------------------------------------------  IN: 4700 mL / OUT: 0 mL / NET: 4700 mL      Constitutional: No acute distress.  Respiratory:  No signs of respiratory distress. Lung sounds are clear bilaterally.  Cardiovascular:  S1 S2, Regular rate and rhythm.  Abdominal: Abdomen is soft, symmetric, and non-tender without distention.     Data:                        12.4   7.90  )-----------( 290      ( 03 Jul 2023 08:21 )             35.8     Hgb trend:  12.4  07-03-23 @ 08:21  11.4  07-02-23 @ 07:07  11.7  07-01-23 @ 12:38  12.1  07-01-23 @ 05:09  15.6  06-30-23 @ 14:25        07-03    138  |  103  |  4<L>  ----------------------------<  80  3.9   |  21  |  0.5<L>    Ca    8.3<L>      03 Jul 2023 08:21  Mg     2.0     07-03    TPro  6.3  /  Alb  3.5  /  TBili  0.5  /  DBili  x   /  AST  27  /  ALT  164<H>  /  AlkPhos  115  07-03    Liver panel trend:  TBili 0.5   /   AST 27   /      /   AlkP 115   /   Tptn 6.3   /   Alb 3.5    /   DBili --      07-03  TBili 0.8   /   AST 43   /      /   AlkP 113   /   Tptn 5.7   /   Alb 3.3    /   DBili --      07-02  TBili 1.4   /   AST 99   /      /   AlkP 136   /   Tptn 6.1   /   Alb 3.6    /   DBili 0.6      07-01  TBili 1.8   /      /      /   AlkP 199   /   Tptn 7.7   /   Alb 4.5    /   DBili 1.0      06-30          Culture - Blood (collected 01 Jul 2023 05:09)  Source: .Blood None  Preliminary Report (02 Jul 2023 17:01):    No growth at 24 hours    Culture - Blood (collected 01 Jul 2023 01:10)  Source: .Blood Blood-Peripheral  Preliminary Report (03 Jul 2023 07:01):    No growth at 48 Hours         Radiology:      < from: CT Abdomen and Pelvis w/ IV Cont (06.30.23 @ 16:08) >  FINDINGS:    LOWER CHEST: Bibasilar subsegmental atelectasis.    HEPATOBILIARY: Hepatic steatosis. Cholelithiasis. Common bile duct is   mildly dilated at 0.7 cm.    SPLEEN: Unremarkable.    PANCREAS: Peripancreatic fat stranding without discrete fluid collection.   Homogenous parenchymal enhancement.    ADRENAL GLANDS: Unremarkable.    KIDNEYS: Left renal hypodensity too small to characterize. Symmetric   renal enhancement. No hydronephrosis..    ABDOMINOPELVIC NODES: No enlarged abdominal or pelvic lymph nodes.    PELVIC ORGANS: Calcified uterine fibroid. Otherwise unremarkable.    PERITONEUM/MESENTERY/BOWEL there are inflammatory changes surrounding the   mid descending colon with a fat-containing appendage contiguous to it   consistent with epiploic appendigitis Abdominal pelvic ascites, likely   from pancreatitis. No intraperitoneal free air, ascites, or bowel   obstruction. The appendix is not seen however no inflammatory changes are   seen in the right lower quadrant    BONES/SOFT TISSUES: No acute osseous abnormality. Small fat-containing   umbilical hernia.    VASCULAR: The abdominal aorta is of normal caliber..      IMPRESSION:    1.  Peripancreatic fat stranding without discrete fluid collection,   suggesting mild pancreatitis. Correlation with laboratory findings   suggested  2.  there are inflammatory changes surrounding the mid descending colon   with a fat-containing appendage contiguous to it consistent with epiploic   appendigitis    < end of copied text >

## 2023-07-03 NOTE — PROGRESS NOTE ADULT - ASSESSMENT
37 year-old Female with past medical history of pancreatitis and gallstones presents with complaint of abdominal pain.  Patient states abdominal pain has been present for 3 days. Patient is being admitted to the ICU for gallstone pancreatitis     #)Gall stone pancreatitis (Second episode)   Was hospitalized from 05/05/23 to 05/08/2023, for similar symptoms and was diagnosed with Gallstone Pancreatitis. She had EUS on 5/8 which showed no cbd stone, cbd 7mm and was discharged with plan for cholecystectomy as OP.   This admission: patient is tachycardic,  Lipase >3000 ALK Phos 199/ / / TB 1.8/ . CT AP showed Peripancreatic fat stranding without discrete fluid collection, suggesting mild pancreatitis. US showed Echogenic liver compatible with hepatic steatosis. Cholelithiasis and biliary sludge. Mild wall thickening and surrounding inflammatory changes.     Today   tachycardia resolved   LFT downtrending  Symptoms resolved   No fever no leukocytosis     PLAN   CLD    cc/ Hour   MICU monitoring   Surgery Follow up. Recommend MRCP if negative would recommend Cholecystectomy  before discharge since patient is high risk for recurrent gall stone pancreatitis.   LFTs improving. Will consider ERCP in case of worsening LFTs or any evidence of impacted stones.   Supportive care per MICU team   Pain control     # Hepatic steatosis   - Follow up with our Hepatology Clinic located at [address] [place] [address]. Phone Number: [phone]     37 year-old Female with past medical history of pancreatitis and gallstones presents with complaint of abdominal pain.  Patient states abdominal pain has been present for 3 days. Patient is being admitted for gallstone pancreatitis.   -Was hospitalized from 05/05/23 to 05/08/2023, for similar symptoms and was diagnosed with Gallstone Pancreatitis. She had EUS on 5/8 which showed no cbd stone, cbd 7mm and was discharged with plan for cholecystectomy as OP.   This admission: patient is tachycardic,  Lipase >3000 ALK Phos 199/ / / TB 1.8/ . CT AP showed Peripancreatic fat stranding without discrete fluid collection, suggesting mild pancreatitis. US showed Echogenic liver compatible with hepatic steatosis. Cholelithiasis and biliary sludge. Mild wall thickening and surrounding inflammatory changes.  pancreatitis     #)Gall stone pancreatitis (Second episode)-improved   #)?acute cholecystitis   -Lipase >3000 and pain, CT scan abdomen positive   -first episode in may 2023   -She had EUS on 5/8 which showed no cbd stone, cbd 7mm and was discharged with plan for cholecystectomy as OP  -CT abdomen showed Peripancreatic fat stranding without discrete fluid collection, suggesting mild pancreatitis.  -US showed +GS, sludge and GB wall thickening   -Admitted with inc LFT 1.8/199/220/639 trending down   -hemodynamically stable   -Tolerating liquids   -Abdominal pain resolved     Recs:  low fat diet   taper IVF    Pending MRCP results   pain control as needed  follow up with surgery for CCY     #)Hepatic steatosis likely NAFLD  -US showed hepatic steatosis   -Advised on diet and life style modifications   -Trend LFT, INR  -Avoid hepatotoxic medications   -Follow up with hepatology as an OP   37 year-old Female with past medical history of pancreatitis and gallstones presents with complaint of abdominal pain.  Patient states abdominal pain has been present for 3 days. Patient is being admitted for gallstone pancreatitis.   -Was hospitalized from 05/05/23 to 05/08/2023, for similar symptoms and was diagnosed with Gallstone Pancreatitis. She had EUS on 5/8 which showed no cbd stone, cbd 7mm and was discharged with plan for cholecystectomy as OP.   This admission: patient is tachycardic,  Lipase >3000 ALK Phos 199/ / / TB 1.8/ . CT AP showed Peripancreatic fat stranding without discrete fluid collection, suggesting mild pancreatitis. US showed Echogenic liver compatible with hepatic steatosis. Cholelithiasis and biliary sludge. Mild wall thickening and surrounding inflammatory changes.  pancreatitis     #)Gall stone pancreatitis (Second episode)-improved   #)?acute cholecystitis   -Lipase >3000 and pain, CT scan abdomen positive   -first episode in may 2023   -She had EUS on 5/8 which showed no cbd stone, cbd 7mm and was discharged with plan for cholecystectomy as OP  -CT abdomen showed Peripancreatic fat stranding without discrete fluid collection, suggesting mild pancreatitis.  -US showed +GS, sludge and GB wall thickening   -Admitted with inc LFT 1.8/199/220/639 trending down   -hemodynamically stable   -Tolerating liquids   -Abdominal pain resolved     Recs:  low fat diet   taper IVF    Pending MRCP results   pain control as needed  follow up with surgery for CCY     #)Hepatic steatosis likely NAFLD  -US showed hepatic steatosis   -Advised on diet and life style modifications   -Trend LFT, INR  -Avoid hepatotoxic medications   -Follow up with hepatology as an OP    #)Epiploic appendigitis mid descending colon   -there are inflammatory changes surrounding the mid descending colon with a fat-containing appendage contiguous to it consistent with epiploic   appendigitis  -Follow up with surgery  37 year-old Female with past medical history of pancreatitis and gallstones presents with complaint of abdominal pain.  Patient states abdominal pain has been present for 3 days. Patient is being admitted for gallstone pancreatitis.   -Was hospitalized from 05/05/23 to 05/08/2023, for similar symptoms and was diagnosed with Gallstone Pancreatitis. She had EUS on 5/8 which showed no cbd stone, cbd 7mm and was discharged with plan for cholecystectomy as OP.   This admission: patient is tachycardic,  Lipase >3000 ALK Phos 199/ / / TB 1.8/ . CT AP showed Peripancreatic fat stranding without discrete fluid collection, suggesting mild pancreatitis. US showed Echogenic liver compatible with hepatic steatosis. Cholelithiasis and biliary sludge. Mild wall thickening and surrounding inflammatory changes.  pancreatitis     #)Gall stone pancreatitis (Second episode)-improved   #)?acute cholecystitis   -Lipase >3000 and pain, CT scan abdomen positive   -first episode in may 2023   -She had EUS on 5/8 which showed no cbd stone, cbd 7mm and was discharged with plan for cholecystectomy as OP  -CT abdomen showed Peripancreatic fat stranding without discrete fluid collection, suggesting mild pancreatitis.  -US showed +GS, sludge and GB wall thickening   -Admitted with inc LFT 1.8/199/220/639 trending down   -hemodynamically stable   -Tolerating liquids   -Abdominal pain resolved   -MRCP negative for CBD stone     Recs:  low fat diet   taper IVF    pain control as needed  follow up with surgery for CCY     #)Hepatic steatosis likely NAFLD  -US showed hepatic steatosis   -Advised on diet and life style modifications   -Trend LFT, INR  -Avoid hepatotoxic medications   -Follow up with hepatology as an OP    #)Epiploic appendigitis mid descending colon   -there are inflammatory changes surrounding the mid descending colon with a fat-containing appendage contiguous to it consistent with epiploic   appendigitis  -Follow up with surgery     recall as needed 37 year-old Female with past medical history of pancreatitis and gallstones presents with complaint of abdominal pain.  Patient states abdominal pain has been present for 3 days. Patient is being admitted for gallstone pancreatitis.   -Was hospitalized from 05/05/23 to 05/08/2023, for similar symptoms and was diagnosed with Gallstone Pancreatitis. She had EUS on 5/8 which showed no cbd stone, cbd 7mm and was discharged with plan for cholecystectomy as OP.   This admission: patient is tachycardic,  Lipase >3000 ALK Phos 199/ / / TB 1.8/ . CT AP showed Peripancreatic fat stranding without discrete fluid collection, suggesting mild pancreatitis. US showed Echogenic liver compatible with hepatic steatosis. Cholelithiasis and biliary sludge. Mild wall thickening and surrounding inflammatory changes.  pancreatitis     #)Gall stone pancreatitis (Second episode)-improved   #)?acute cholecystitis   -Lipase >3000 and pain, CT scan abdomen positive   -first episode in may 2023   -She had EUS on 5/8 which showed no cbd stone, cbd 7mm and was discharged with plan for cholecystectomy as OP  -CT abdomen showed Peripancreatic fat stranding without discrete fluid collection, suggesting mild pancreatitis.  -US showed +GS, sludge and GB wall thickening   -Admitted with inc LFT 1.8/199/220/639 trending down   -hemodynamically stable   -Tolerating liquids   -Abdominal pain resolved   -MRCP negative for CBD stone        Recs:  low fat diet   taper IVF    pain control as needed  follow up with surgery for CCY     #)Hepatic steatosis likely NAFLD  -US showed hepatic steatosis   -Advised on diet and life style modifications   -Trend LFT, INR  -Avoid hepatotoxic medications   -Follow up with hepatology as an OP    #)Epiploic appendigitis mid descending colon   -there are inflammatory changes surrounding the mid descending colon with a fat-containing appendage contiguous to it consistent with epiploic   appendigitis  -Follow up with surgery     recall as needed

## 2023-07-03 NOTE — PROGRESS NOTE ADULT - SUBJECTIVE AND OBJECTIVE BOX
GENERAL SURGERY PROGRESS NOTE    Patient: KATI RAYMUNDO , 37y ([birth year])Female   MRN: [id number]  Location: [address] ([address]) [address]  Visit: 06-30-23 Inpatient  Date: 07-03-23 @ 11:12    Hospital Day #: 4    Events of past 24 hours: pending MRCP results. Patient abdomen soft, NT, ND    PAST MEDICAL & SURGICAL HISTORY:  Pancreatitis          Vitals:   T(F): 97.9 (07-03-23 @ 08:11), Max: 99.5 (07-02-23 @ 16:00)  HR: 89 (07-03-23 @ 08:11)  BP: 132/71 (07-03-23 @ 08:11)  RR: 18 (07-03-23 @ 08:11)  SpO2: --      Diet, Clear Liquid      Fluids: lactated ringers.: Solution, 1000 milliLiter(s) infuse at 150 mL/Hr      I & O's:    07-02-23 @ 07:01  -  07-03-23 @ 07:00  --------------------------------------------------------  IN:    Lactated Ringers: 2750 mL    Lactated Ringers: 1950 mL  Total IN: 4700 mL    OUT:  Total OUT: 0 mL    Total NET: 4700 mL        Bowel Movement: : [] YES [] NO  Flatus: : [] YES [] NO    PHYSICAL EXAM:  General: NAD, AAOx3,   HEENT: NCAT, JULISSA, EOMI,  Cardiac: RRR   Respiratory: CTAB, normal respiratory effort,  Abdomen: Soft, non-distended, non-tender,   Skin: Warm/dry, normal color, no jaundice    MEDICATIONS  (STANDING):  cefTRIAXone   IVPB 2000 milliGRAM(s) IV Intermittent every 24 hours  chlorhexidine 2% Cloths 1 Application(s) Topical daily  enoxaparin Injectable 40 milliGRAM(s) SubCutaneous every 24 hours  lactated ringers. 1000 milliLiter(s) (150 mL/Hr) IV Continuous <Continuous>  metroNIDAZOLE  IVPB 500 milliGRAM(s) IV Intermittent every 8 hours    MEDICATIONS  (PRN):  ketorolac   Injectable 30 milliGRAM(s) IV Push every 6 hours PRN Severe Pain (7 - 10)  ondansetron Injectable 4 milliGRAM(s) IV Push every 8 hours PRN Nausea and/or Vomiting      DVT PROPHYLAXIS: enoxaparin Injectable 40 milliGRAM(s) SubCutaneous every 24 hours    GI PROPHYLAXIS:   ANTICOAGULATION:   ANTIBIOTICS:  cefTRIAXone   IVPB 2000 milliGRAM(s)  metroNIDAZOLE  IVPB 500 milliGRAM(s)            LAB/STUDIES:  Labs:  CAPILLARY BLOOD GLUCOSE                              12.4   7.90  )-----------( 290      ( 03 Jul 2023 08:21 )             35.8       Auto Neutrophil %: 65.1 % (07-03-23 @ 08:21)  Auto Immature Granulocyte %: 0.4 % (07-03-23 @ 08:21)    07-03    138  |  103  |  4<L>  ----------------------------<  80  3.9   |  21  |  0.5<L>      Calcium: 8.3 mg/dL (07-03-23 @ 08:21)      LFTs:             6.3  | 0.5  | 27       ------------------[115     ( 03 Jul 2023 08:21 )  3.5  | x    | 164         Lipase:x      Amylase:x         Lactate, Blood: 1.0 mmol/L (07-01-23 @ 05:09)  Lactate, Blood: 1.3 mmol/L (06-30-23 @ 14:25)      Coags:            Urinalysis Basic - ( 03 Jul 2023 08:21 )    Color: x / Appearance: x / SG: x / pH: x  Gluc: 80 mg/dL / Ketone: x  / Bili: x / Urobili: x   Blood: x / Protein: x / Nitrite: x   Leuk Esterase: x / RBC: x / WBC x   Sq Epi: x / Non Sq Epi: x / Bacteria: x        Culture - Blood (collected 01 Jul 2023 05:09)  Source: .Blood None  Preliminary Report (02 Jul 2023 17:01):    No growth at 24 hours    Culture - Blood (collected 01 Jul 2023 01:10)  Source: .Blood Blood-Peripheral  Preliminary Report (03 Jul 2023 07:01):    No growth at 48 Hours              IMAGING:

## 2023-07-03 NOTE — PROGRESS NOTE ADULT - SUBJECTIVE AND OBJECTIVE BOX
KATI RAYMUNDO 37y Female  MRN#: [id number]   Hospital Day: 3d    HPI:  37 year-old Female with pmx of gallstone pancreatitis presents with complaint of abdominal pain.  Patient states abdominal pain has been present for 3 days. She describes the pain as sharp localized to the epigastric region as well as the left upper quadrant. She stated that the pain after she had peach.  She endorses that the pain occasionally radiates to the back between the shoulder blades.  She states that the pain has been associated with 4 episodes of nonbilious nonbloody vomiting yesterday and 4 episodes today.  Patient states pain is worse with meals, comes and goes. She initially stated that she thought it was food poisoning but since her symptoms did not resolve she decided to come to the ED. Denies any urinary symptoms.   Denies fever/chills, chest pain, shortness of breath, diarrhea, lightheadedness/weakness, numbness/tingling. States that this feels different from her previous episode of pancreatitis as the pain was more right sided at that time.    Of note patient was hospitalized from 05/05/23 to 05/08/2023, for similar symptoms and was diagnosed with Gallstone Pancreatitis. She had ERCP/EUS on 5/8 which showed no cbd stone, cbd 7mm and was discharged with plan for cholecystectomy as OP.    In ED:   VS were /81, . RR 18, T 97.8, SPO2 95% on RA.   Labs were  significant for:   WBC of 14k , AG 17  UA positive for large ketones, nitrites, LE and bacteria  Lipase >3000   ALK Phos 199/ / / TB 1.8/ DB 1    EKG showed Sinus tachycardia     CXR unremarkable  CT AP showed1.  Peripancreatic fat stranding without discrete fluid collection, suggesting mild pancreatitis. 2.  there are inflammatory changes surrounding the mid descending colon   with a fat-containing appendage contiguous to it consistent with epiploic appendicitis    US showed Echogenic liver compatible with hepatic steatosis. Cholelithiasis and biliary sludge. Mild wall thickening and surrounding inflammatory changes. Findings suggesting acute cholecystitis.  Patient received ceftriaxone and flagyl x1, 1L LR bolus     Patient is being admitted to the ICU for gallstone pancreatitis    (30 Jun 2023 20:19)      SUBJECTIVE  Patient is a 37y old Female who presents with a chief complaint of Gallstone Pancreatitis (03 Jul 2023 13:08)  Currently admitted to medicine with the primary diagnosis of Pancreatitis      INTERVAL HPI AND OVERNIGHT EVENTS:  Patient was examined and seen at bedside. This morning she is resting comfortably in bed and reports no issues or overnight events.    REVIEW OF SYMPTOMS:  CONSTITUTIONAL: No weakness, fevers or chills; No headaches  EYES: No visual changes, eye pain, or discharge  ENT: No vertigo; No ear pain or change in hearing; No sore throat or difficulty swallowing  NECK: No pain or stiffness  RESPIRATORY: No cough, wheezing, or hemoptysis; No shortness of breath  CARDIOVASCULAR: No chest pain or palpitations  GASTROINTESTINAL: No abdominal or epigastric pain; No nausea, vomiting, or hematemesis; No diarrhea or constipation; No melena or hematochezia  GENITOURINARY: No dysuria, frequency or hematuria  MUSCULOSKELETAL: No joint pain, no muscle pain, no weakness  NEUROLOGICAL: No numbness or weakness  SKIN: No itching or rashes    OBJECTIVE  PAST MEDICAL & SURGICAL HISTORY  Pancreatitis      ALLERGIES:  No Known Allergies    MEDICATIONS:  STANDING MEDICATIONS  cefTRIAXone   IVPB 2000 milliGRAM(s) IV Intermittent every 24 hours  chlorhexidine 2% Cloths 1 Application(s) Topical daily  enoxaparin Injectable 40 milliGRAM(s) SubCutaneous every 24 hours  lactated ringers. 1000 milliLiter(s) IV Continuous <Continuous>  metroNIDAZOLE  IVPB 500 milliGRAM(s) IV Intermittent every 8 hours    PRN MEDICATIONS  ketorolac   Injectable 30 milliGRAM(s) IV Push every 6 hours PRN  ondansetron Injectable 4 milliGRAM(s) IV Push every 8 hours PRN      PHYSICAL EXAM:  CONSTITUTIONAL: No acute distress, well-developed, well-groomed, AAOx3  HEAD: Atraumatic, normocephalic  EYES: EOM intact, PERRLA, conjunctiva and sclera clear  ENT: Supple, no masses, no thyromegaly, no bruits, no JVD; moist mucous membranes  PULMONARY: Clear to auscultation bilaterally; no wheezes, rales, or rhonchi  CARDIOVASCULAR: Regular rate and rhythm; no murmurs, rubs, or gallops  GASTROINTESTINAL: Soft, non-tender, non-distended; bowel sounds present  MUSCULOSKELETAL: 2+ peripheral pulses; no clubbing, no cyanosis, no edema  NEUROLOGY: non-focal  SKIN: No rashes or lesions; warm and dry    VITAL SIGNS: Last 24 Hours  T(C): 36.6 (03 Jul 2023 08:11), Max: 37.5 (02 Jul 2023 16:00)  T(F): 97.9 (03 Jul 2023 08:11), Max: 99.5 (02 Jul 2023 16:00)  HR: 89 (03 Jul 2023 08:11) (89 - 106)  BP: 132/71 (03 Jul 2023 08:11) (132/71 - 150/71)  BP(mean): --  RR: 18 (03 Jul 2023 08:11) (18 - 18)  SpO2: --    LABS:                        12.4   7.90  )-----------( 290      ( 03 Jul 2023 08:21 )             35.8     07-03    138  |  103  |  4<L>  ----------------------------<  80  3.9   |  21  |  0.5<L>    Ca    8.3<L>      03 Jul 2023 08:21  Mg     2.0     07-03    TPro  6.3  /  Alb  3.5  /  TBili  0.5  /  DBili  x   /  AST  27  /  ALT  164<H>  /  AlkPhos  115  07-03      Urinalysis Basic - ( 03 Jul 2023 08:21 )    Color: x / Appearance: x / SG: x / pH: x  Gluc: 80 mg/dL / Ketone: x  / Bili: x / Urobili: x   Blood: x / Protein: x / Nitrite: x   Leuk Esterase: x / RBC: x / WBC x   Sq Epi: x / Non Sq Epi: x / Bacteria: x            Culture - Blood (collected 01 Jul 2023 05:09)  Source: .Blood None  Preliminary Report (02 Jul 2023 17:01):    No growth at 24 hours    Culture - Blood (collected 01 Jul 2023 01:10)  Source: .Blood Blood-Peripheral  Preliminary Report (03 Jul 2023 07:01):    No growth at 48 Hours          RADIOLOGY:      ASSESSMENT & PLAN    #Misc  - DVT Prophylaxis:  - Diet:  - GI Prophylaxis:  - Activity:  - IV Fluids:  - Code Status:  - Dispo: KATI RAYMUNDO 37y Female  MRN#: [id number]   Hospital Day: 3d    HPI:  37 year-old Female with pmx of gallstone pancreatitis presents with complaint of abdominal pain.  Patient states abdominal pain has been present for 3 days. She describes the pain as sharp localized to the epigastric region as well as the left upper quadrant. She stated that the pain after she had peach.  She endorses that the pain occasionally radiates to the back between the shoulder blades.  She states that the pain has been associated with 4 episodes of nonbilious nonbloody vomiting yesterday and 4 episodes today.  Patient states pain is worse with meals, comes and goes. She initially stated that she thought it was food poisoning but since her symptoms did not resolve she decided to come to the ED. Denies any urinary symptoms.   Denies fever/chills, chest pain, shortness of breath, diarrhea, lightheadedness/weakness, numbness/tingling. States that this feels different from her previous episode of pancreatitis as the pain was more right sided at that time.    Of note patient was hospitalized from 05/05/23 to 05/08/2023, for similar symptoms and was diagnosed with Gallstone Pancreatitis. She had ERCP/EUS on 5/8 which showed no cbd stone, cbd 7mm and was discharged with plan for cholecystectomy as OP.    In ED:   VS were /81, . RR 18, T 97.8, SPO2 95% on RA.   Labs were  significant for:   WBC of 14k , AG 17  UA positive for large ketones, nitrites, LE and bacteria  Lipase >3000   ALK Phos 199/ / / TB 1.8/ DB 1    EKG showed Sinus tachycardia     CXR unremarkable  CT AP showed1.  Peripancreatic fat stranding without discrete fluid collection, suggesting mild pancreatitis. 2.  there are inflammatory changes surrounding the mid descending colon   with a fat-containing appendage contiguous to it consistent with epiploic appendicitis    US showed Echogenic liver compatible with hepatic steatosis. Cholelithiasis and biliary sludge. Mild wall thickening and surrounding inflammatory changes. Findings suggesting acute cholecystitis.  Patient received ceftriaxone and flagyl x1, 1L LR bolus     Patient is being admitted to the ICU for gallstone pancreatitis    (30 Jun 2023 20:19)      SUBJECTIVE  Patient is a 37y old Female who presents with a chief complaint of Gallstone Pancreatitis (03 Jul 2023 13:08)  Currently admitted to medicine with the primary diagnosis of Pancreatitis      INTERVAL HPI AND OVERNIGHT EVENTS:  Patient was examined and seen at bedside. This morning she is resting comfortably in bed and reports no issues or overnight events.    REVIEW OF SYMPTOMS:  CONSTITUTIONAL: No weakness, fevers or chills; No headaches  EYES: No visual changes, eye pain, or discharge  ENT: No vertigo; No ear pain or change in hearing; No sore throat or difficulty swallowing  NECK: No pain or stiffness  RESPIRATORY: No cough, wheezing, or hemoptysis; No shortness of breath  CARDIOVASCULAR: No chest pain or palpitations  GASTROINTESTINAL: No abdominal or epigastric pain; No nausea, vomiting, or hematemesis; No diarrhea or constipation; No melena or hematochezia  GENITOURINARY: No dysuria, frequency or hematuria  MUSCULOSKELETAL: No joint pain, no muscle pain, no weakness  NEUROLOGICAL: No numbness or weakness  SKIN: No itching or rashes    OBJECTIVE  PAST MEDICAL & SURGICAL HISTORY  Pancreatitis      ALLERGIES:  No Known Allergies    MEDICATIONS:  STANDING MEDICATIONS  cefTRIAXone   IVPB 2000 milliGRAM(s) IV Intermittent every 24 hours  chlorhexidine 2% Cloths 1 Application(s) Topical daily  enoxaparin Injectable 40 milliGRAM(s) SubCutaneous every 24 hours  lactated ringers. 1000 milliLiter(s) IV Continuous <Continuous>  metroNIDAZOLE  IVPB 500 milliGRAM(s) IV Intermittent every 8 hours    PRN MEDICATIONS  ketorolac   Injectable 30 milliGRAM(s) IV Push every 6 hours PRN  ondansetron Injectable 4 milliGRAM(s) IV Push every 8 hours PRN      PHYSICAL EXAM:  CONSTITUTIONAL: No acute distress, well-developed, well-groomed, AAOx3  HEAD: Atraumatic, normocephalic  EYES: EOM intact, PERRLA, conjunctiva and sclera clear  ENT: Supple, no masses, no thyromegaly, no bruits, no JVD; moist mucous membranes  PULMONARY: Clear to auscultation bilaterally; no wheezes, rales, or rhonchi  CARDIOVASCULAR: Regular rate and rhythm; no murmurs, rubs, or gallops  GASTROINTESTINAL: Soft, non-tender, non-distended; bowel sounds present  MUSCULOSKELETAL: 2+ peripheral pulses; no clubbing, no cyanosis, no edema  NEUROLOGY: non-focal  SKIN: No rashes or lesions; warm and dry    VITAL SIGNS: Last 24 Hours  T(C): 36.6 (03 Jul 2023 08:11), Max: 37.5 (02 Jul 2023 16:00)  T(F): 97.9 (03 Jul 2023 08:11), Max: 99.5 (02 Jul 2023 16:00)  HR: 89 (03 Jul 2023 08:11) (89 - 106)  BP: 132/71 (03 Jul 2023 08:11) (132/71 - 150/71)  BP(mean): --  RR: 18 (03 Jul 2023 08:11) (18 - 18)  SpO2: --    LABS:                        12.4   7.90  )-----------( 290      ( 03 Jul 2023 08:21 )             35.8     07-03    138  |  103  |  4<L>  ----------------------------<  80  3.9   |  21  |  0.5<L>    Ca    8.3<L>      03 Jul 2023 08:21  Mg     2.0     07-03    TPro  6.3  /  Alb  3.5  /  TBili  0.5  /  DBili  x   /  AST  27  /  ALT  164<H>  /  AlkPhos  115  07-03      Urinalysis Basic - ( 03 Jul 2023 08:21 )    Color: x / Appearance: x / SG: x / pH: x  Gluc: 80 mg/dL / Ketone: x  / Bili: x / Urobili: x   Blood: x / Protein: x / Nitrite: x   Leuk Esterase: x / RBC: x / WBC x   Sq Epi: x / Non Sq Epi: x / Bacteria: x            Culture - Blood (collected 01 Jul 2023 05:09)  Source: .Blood None  Preliminary Report (02 Jul 2023 17:01):    No growth at 24 hours    Culture - Blood (collected 01 Jul 2023 01:10)  Source: .Blood Blood-Peripheral  Preliminary Report (03 Jul 2023 07:01):    No growth at 48 Hours          RADIOLOGY:      ASSESSMENT & PLAN    37 year-old Female with past medical history of pancreatitis and gallstones presents with complaint of abdominal pain.  Found to have gallstone pancreatitis.      ·Gallstone pancreatitis   SIRS on admission wbc 14, tachy 114  ·Lipase 3K to 600  Started with fluids and ceftriaxone and metronidazole   USG : Cholelithiasis and biliary sludge. Mild wall thickening and surrounding   inflammatory changes. Findings suggesting acute cholecystitis.    ·CT consistent with pancreatitis & Epiploic appendagitis mid descending colon  , hepatic steatosis  · MRCP 07/02-  cholelithiasis.No biliary ductal dilatation or evidence of choledocholithiasis.Diffuse hepatic steatosis. Plan for CCY as per surgery consult  ·LFTS downtrending. IVF rate @ 100ml/hr.    #)Epiploic appendigitis mid descending colon   -there are inflammatory changes surrounding the mid descending colon with a fat-containing appendage contiguous to it consistent with epiploic   appendigitis  -Follow up with surgery       ·Class ii obesity , BMI 38    # Hepatic steatosis   - Follow up with our Hepatology Clinic located at [address] [place] [address]. Phone Number: [phone]     #Misc  - DVT Prophylaxis: enoxaparin  - Diet: clear fluid  - Activity: As tolerated  - IV Fluids: 100ml/hr RL  - Code Status:  - Dispo: CCY , Get Hb , lipid panel, INR

## 2023-07-03 NOTE — PROGRESS NOTE ADULT - ASSESSMENT
37 year-old Female with past medical history of pancreatitis and gallstones presents with complaint of abdominal pain.  Found to have gallstone pancreatitis.      ·	Gallstone pancreatitis   ·	Class ii obesity , BMI 38      ·	CT consistent with pancreatitis & Epiploic appendagitis mid descending colon  , hepatic steatosis  ·	 MRCP 07/02-  cholelithiasis.No biliary ductal dilatation or evidence of choledocholithiasis.Diffuse hepatic steatosis.  ·	LFTS downtrending. Reduce IVF rate to 150 ml/hr  ·	continue with ceftriaxone and flagyl. Possible acute cystitis vs just pyuria as no dysuria, UA positive( pt on Rocephin anyway)  ·	Get Hb , lipid panel  ·	Lipase 3K to 600  ·	Plan for CCY after MRCp next week  ·	Started on clears today  ·	Expected inpatient care exceeds 48 hrs. pending plan by surgery for Inpatient CCY

## 2023-07-03 NOTE — PROGRESS NOTE ADULT - SUBJECTIVE AND OBJECTIVE BOX
Progress Note:  Provider Speciality                            Hospitalist      KATI RAYMUNDO MRN-[id number] 37y Female     CHIEF PRESENTING COMPLAINT:  Patient is a 37y old  Female who presents with a chief complaint of Gallstone Pancreatitis (02 Jul 2023 09:09)        SUBJECTIVE:  Patient was seen and examined at bedside.  no complaint of epi pain  No significant overnight events reported.     HISTORY OF PRESENTING ILLNESS:  HPI:  37 year-old Female with past medical history of pancreatitis and gallstones presents with complaint of abdominal pain.  Patient states abdominal pain has been present for 3 days. She describes the pain as sharp localized to the epigastric region as well as the left upper quadrant. She stated that the pain after she had peach.  She endorses that the pain occasionally radiates to the back between the shoulder blades.  She states that the pain has been associated with 4 episodes of nonbilious nonbloody vomiting yesterday and 4 episodes today.  Patient states pain is worse with meals, comes and goes. She initially stated that she thought it was food poisoning but since her symptoms did not resolve she decided to come to the ED. Denies any urinary symptoms.   Denies fever/chills, chest pain, shortness of breath, diarrhea, lightheadedness/weakness, numbness/tingling. States that this feels different from her previous episode of pancreatitis as the pain was more right sided at that time.    Of note patient was hospitalized from 05/05/23 to 05/08/2023, for similar symptoms and was diagnosed with Gallstone Pancreatitis. She had ERCP/EUS on 5/8 which showed no cbd stone, cbd 7mm and was discharged with plan for cholecystectomy as OP.    In ED:   VS were /81, . RR 18, T 97.8, SPO2 95% on RA.   Labs were  significant for:   WBC of 14k , AG 17  UA positive for large ketones, nitrites, LE and bacteria  Lipase >3000   ALK Phos 199/ / / TB 1.8/ DB 1    EKG showed Sinus tachycardia     CXR unremarkable  CT AP showed1.  Peripancreatic fat stranding without discrete fluid collection, suggesting mild pancreatitis. 2.  there are inflammatory changes surrounding the mid descending colon   with a fat-containing appendage contiguous to it consistent with epiploic appendicitis    US showed Echogenic liver compatible with hepatic steatosis. Cholelithiasis and biliary sludge. Mild wall thickening and surrounding inflammatory changes. Findings suggesting acute cholecystitis.  Patient received ceftriaxone and flagyl x1, 1L LR bolus     Patient is being admitted to the ICU for gallstone pancreatitis    (30 Jun 2023 20:19)        REVIEW OF SYSTEMS:  Patient denies  headache, fever, chills. Denies chest pain, shortness of breath, palpitation. Denies nausea, vomiting, abdominal pain or diarrhoea, Denies dysuria.   At least 10 systems were reviewed in ROS. All systems reviewed  are within normal limits except for the complaints as described in Subjective.    PAST MEDICAL & SURGICAL HISTORY:  PAST MEDICAL & SURGICAL HISTORY:  Pancreatitis              VITAL SIGNS:  Vital Signs Last 24 Hrs  T(C): 36.6 (03 Jul 2023 08:11), Max: 37.5 (02 Jul 2023 16:00)  T(F): 97.9 (03 Jul 2023 08:11), Max: 99.5 (02 Jul 2023 16:00)  HR: 89 (03 Jul 2023 08:11) (89 - 106)  BP: 132/71 (03 Jul 2023 08:11) (132/71 - 150/71)  BP(mean): --  RR: 18 (03 Jul 2023 08:11) (18 - 18)  SpO2: --              PHYSICAL EXAMINATION:  Not in acute distress, obese  General: No icterus  HEENT:   no JVD.  Heart: S1+S2 audible  Lungs: bilateral  moderate air entry, no wheezing, no crepitations.  Abdomen: Soft, non-tender, non-distended , no  rigidity or guarding.  CNS: Awake alert, CN  grossly intact.  Extremities:  No edema            CONSULTS:  Consultant(s) Notes Reviewed by me.   Care Discussed with Consultants/Other Providers where required.    All the images and labs were reviewed today        MEDICATIONS:  MEDICATIONS  (STANDING):  cefTRIAXone   IVPB 2000 milliGRAM(s) IV Intermittent every 24 hours  chlorhexidine 2% Cloths 1 Application(s) Topical daily  enoxaparin Injectable 40 milliGRAM(s) SubCutaneous every 24 hours  lactated ringers. 1000 milliLiter(s) (250 mL/Hr) IV Continuous <Continuous>  metroNIDAZOLE  IVPB 500 milliGRAM(s) IV Intermittent every 8 hours    MEDICATIONS  (PRN):  ketorolac   Injectable 30 milliGRAM(s) IV Push every 6 hours PRN Severe Pain (7 - 10)  ondansetron Injectable 4 milliGRAM(s) IV Push every 8 hours PRN Nausea and/or Vomiting

## 2023-07-03 NOTE — PROGRESS NOTE ADULT - ASSESSMENT
ASSESSMENT:  38 y/o F w/ PMHx of pancreatitis and gallstones. Presented to ED on 6/30/23 with abdominal pain and diagnosed with gallstone pancreatitis.     PLAN:   - F/u MRCP results   - Continue diet   - F/u blood cultures   - Monitor LFTs  - Surgery will continue to follow for possible lap zay     [phone]

## 2023-07-04 LAB
APTT BLD: 31.7 SEC — SIGNIFICANT CHANGE UP (ref 27–39.2)
BLD GP AB SCN SERPL QL: SIGNIFICANT CHANGE UP
INR BLD: 1.45 RATIO — HIGH (ref 0.65–1.3)
LIDOCAIN IGE QN: 72 U/L — HIGH (ref 7–60)
PROTHROM AB SERPL-ACNC: 16.7 SEC — HIGH (ref 9.95–12.87)

## 2023-07-04 PROCEDURE — 99232 SBSQ HOSP IP/OBS MODERATE 35: CPT | Mod: 24,25

## 2023-07-04 PROCEDURE — 99233 SBSQ HOSP IP/OBS HIGH 50: CPT

## 2023-07-04 PROCEDURE — 93010 ELECTROCARDIOGRAM REPORT: CPT

## 2023-07-04 RX ORDER — POLYETHYLENE GLYCOL 3350 17 G/17G
17 POWDER, FOR SOLUTION ORAL DAILY
Refills: 0 | Status: DISCONTINUED | OUTPATIENT
Start: 2023-07-04 | End: 2023-07-05

## 2023-07-04 RX ORDER — SODIUM CHLORIDE 9 MG/ML
1000 INJECTION, SOLUTION INTRAVENOUS
Refills: 0 | Status: DISCONTINUED | OUTPATIENT
Start: 2023-07-05 | End: 2023-07-06

## 2023-07-04 RX ORDER — SENNA PLUS 8.6 MG/1
2 TABLET ORAL AT BEDTIME
Refills: 0 | Status: DISCONTINUED | OUTPATIENT
Start: 2023-07-04 | End: 2023-07-05

## 2023-07-04 RX ADMIN — Medication 100 MILLIGRAM(S): at 13:07

## 2023-07-04 RX ADMIN — CHLORHEXIDINE GLUCONATE 1 APPLICATION(S): 213 SOLUTION TOPICAL at 11:06

## 2023-07-04 RX ADMIN — CEFTRIAXONE 100 MILLIGRAM(S): 500 INJECTION, POWDER, FOR SOLUTION INTRAMUSCULAR; INTRAVENOUS at 21:56

## 2023-07-04 RX ADMIN — Medication 100 MILLIGRAM(S): at 05:15

## 2023-07-04 RX ADMIN — Medication 100 MILLIGRAM(S): at 21:56

## 2023-07-04 RX ADMIN — SENNA PLUS 2 TABLET(S): 8.6 TABLET ORAL at 21:55

## 2023-07-04 NOTE — PROGRESS NOTE ADULT - ASSESSMENT
37 year-old Female with past medical history of pancreatitis and gallstones presents with complaint of abdominal pain.  Found to have gallstone pancreatitis.      ·	Gallstone pancreatitis   ·	Class ii obesity , BMI 38  ·	Pre-DM2 HbA1c 6.1  ·	Dyslipidemia     ]  ·	OR scheduled Wednesday for lap cholecystectomy   ·	CT consistent with pancreatitis & Epiploic appendagitis mid descending colon  , hepatic steatosis  ·	 MRCP 07/02-  cholelithiasis.No biliary ductal dilatation or evidence of choledocholithiasis.Diffuse hepatic steatosis.  ·	LFTS downtrending.   ·	continue with ceftriaxone and flagyl. Possible acute cystitis vs just pyuria as no dysuria, UA positive( pt on Rocephin anyway)  ·	HbA1c 6.1. Diabetic diet. Lifestyle changes and recommended metformin and Lipitor on discharge  ·	Lipase 3K to 75. D/c IVF  ·	Plan for CCY after MRCp next week  Expected inpatient care exceeds 48 hrs. pending plan by surgery for lap cholecystectomy tomorrow

## 2023-07-04 NOTE — PROGRESS NOTE ADULT - ASSESSMENT
38 y/o F w/ PMHx of pancreatitis and gallstones. Presented to ED on 6/30/23 with abdominal pain and diagnosed with gallstone pancreatitis.     PLAN:   - OR scheduled Wednesday for lap cholecystectomy   - Patient needs consent   - Continue diet     [phone]

## 2023-07-04 NOTE — PROGRESS NOTE ADULT - SUBJECTIVE AND OBJECTIVE BOX
Progress Note:  Provider Speciality                            Hospitalist      KATI RAYMUNDO MRN-[id number] 37y Female     CHIEF PRESENTING COMPLAINT:  Patient is a 37y old  Female who presents with a chief complaint of Gallstone Pancreatitis (02 Jul 2023 09:09)        SUBJECTIVE:  Patient was seen and examined at bedside.  No complaint of epi pain  No significant overnight events reported.     HISTORY OF PRESENTING ILLNESS:  HPI:  37 year-old Female with past medical history of pancreatitis and gallstones presents with complaint of abdominal pain.  Patient states abdominal pain has been present for 3 days. She describes the pain as sharp localized to the epigastric region as well as the left upper quadrant. She stated that the pain after she had peach.  She endorses that the pain occasionally radiates to the back between the shoulder blades.  She states that the pain has been associated with 4 episodes of nonbilious nonbloody vomiting yesterday and 4 episodes today.  Patient states pain is worse with meals, comes and goes. She initially stated that she thought it was food poisoning but since her symptoms did not resolve she decided to come to the ED. Denies any urinary symptoms.   Denies fever/chills, chest pain, shortness of breath, diarrhea, lightheadedness/weakness, numbness/tingling. States that this feels different from her previous episode of pancreatitis as the pain was more right sided at that time.    Of note patient was hospitalized from 05/05/23 to 05/08/2023, for similar symptoms and was diagnosed with Gallstone Pancreatitis. She had ERCP/EUS on 5/8 which showed no cbd stone, cbd 7mm and was discharged with plan for cholecystectomy as OP.    In ED:   VS were /81, . RR 18, T 97.8, SPO2 95% on RA.   Labs were  significant for:   WBC of 14k , AG 17  UA positive for large ketones, nitrites, LE and bacteria  Lipase >3000   ALK Phos 199/ / / TB 1.8/ DB 1    EKG showed Sinus tachycardia     CXR unremarkable  CT AP showed1.  Peripancreatic fat stranding without discrete fluid collection, suggesting mild pancreatitis. 2.  there are inflammatory changes surrounding the mid descending colon   with a fat-containing appendage contiguous to it consistent with epiploic appendicitis    US showed Echogenic liver compatible with hepatic steatosis. Cholelithiasis and biliary sludge. Mild wall thickening and surrounding inflammatory changes. Findings suggesting acute cholecystitis.  Patient received ceftriaxone and flagyl x1, 1L LR bolus     Patient is being admitted to the ICU for gallstone pancreatitis    (30 Jun 2023 20:19)        REVIEW OF SYSTEMS:  Patient denies  headache, fever, chills. Denies chest pain, shortness of breath, palpitation. Denies nausea, vomiting, abdominal pain or diarrhoea, Denies dysuria.   At least 10 systems were reviewed in ROS. All systems reviewed  are within normal limits except for the complaints as described in Subjective.    PAST MEDICAL & SURGICAL HISTORY:  PAST MEDICAL & SURGICAL HISTORY:  Pancreatitis              VITAL SIGNS:  Vital Signs Last 24 Hrs  T(C): 36.5 (04 Jul 2023 08:01), Max: 36.9 (03 Jul 2023 15:12)  T(F): 97.7 (04 Jul 2023 08:01), Max: 98.4 (03 Jul 2023 15:12)  HR: 81 (04 Jul 2023 08:01) (80 - 96)  BP: 136/80 (04 Jul 2023 08:01) (130/81 - 143/82)  BP(mean): --  RR: 18 (04 Jul 2023 00:00) (18 - 18)  SpO2: 97% (04 Jul 2023 00:00) (95% - 97%)    Parameters below as of 04 Jul 2023 00:00  Patient On (Oxygen Delivery Method): room air              PHYSICAL EXAMINATION:  Not in acute distress, obese  General: No icterus  HEENT:   no JVD.  Heart: S1+S2 audible  Lungs: bilateral  moderate air entry, no wheezing, no crepitations.  Abdomen: Soft, non-tender, non-distended , no  rigidity or guarding.  CNS: Awake alert, CN  grossly intact.  Extremities:  No edema            CONSULTS:  Consultant(s) Notes Reviewed by me.   Care Discussed with Consultants/Other Providers where required.    All the images and labs were reviewed today        MEDICATIONS:  MEDICATIONS  (STANDING):  cefTRIAXone   IVPB 2000 milliGRAM(s) IV Intermittent every 24 hours  chlorhexidine 2% Cloths 1 Application(s) Topical daily  enoxaparin Injectable 40 milliGRAM(s) SubCutaneous every 24 hours  lactated ringers. 1000 milliLiter(s) (250 mL/Hr) IV Continuous <Continuous>  metroNIDAZOLE  IVPB 500 milliGRAM(s) IV Intermittent every 8 hours    MEDICATIONS  (PRN):  ketorolac   Injectable 30 milliGRAM(s) IV Push every 6 hours PRN Severe Pain (7 - 10)  ondansetron Injectable 4 milliGRAM(s) IV Push every 8 hours PRN Nausea and/or Vomiting

## 2023-07-04 NOTE — PROGRESS NOTE ADULT - SUBJECTIVE AND OBJECTIVE BOX
GENERAL SURGERY PROGRESS NOTE     KATI RAYMUNDO  37y  Female  Hospital day : 5d    OVERNIGHT EVENTS: NAEON. No n/v. Denies any pain. Abdomen soft, NT, Nd.     T(F): 97.2 (07-04-23 @ 00:00), Max: 98.4 (07-03-23 @ 15:12)  HR: 80 (07-04-23 @ 00:00) (80 - 96)  BP: 143/82 (07-04-23 @ 00:00) (130/81 - 143/82)  RR: 18 (07-04-23 @ 00:00) (18 - 18)  SpO2: 97% (07-04-23 @ 00:00) (95% - 97%)    DIET/FLUIDS: lactated ringers. 1000 milliLiter(s) IV Continuous <Continuous>    AC/ proph: enoxaparin Injectable 40 milliGRAM(s) SubCutaneous every 24 hours    ABx: cefTRIAXone   IVPB 2000 milliGRAM(s) IV Intermittent every 24 hours  metroNIDAZOLE  IVPB 500 milliGRAM(s) IV Intermittent every 8 hours      PHYSICAL EXAM:  General: NAD, AAOx3,   HEENT: NCAT, JULISSA, EOMI,  Cardiac: RRR   Respiratory: CTAB, normal respiratory effort,  Abdomen: Soft, non-distended, non-tender,   Skin: Warm/dry, normal color, no jaundice        LABS:                        11.3   6.68  )-----------( 300      ( 03 Jul 2023 22:16 )             33.7       Auto Neutrophil %: 56.0 % (07-03-23 @ 22:16)  Auto Immature Granulocyte %: 0.4 % (07-03-23 @ 22:16)  Auto Neutrophil %: 65.1 % (07-03-23 @ 08:21)  Auto Immature Granulocyte %: 0.4 % (07-03-23 @ 08:21)    07-03    136  |  101  |  4<L>  ----------------------------<  129<H>  3.7   |  22  |  0.5<L>      Calcium: 8.3 mg/dL (07-03-23 @ 22:16)      LFTs:             5.9  | 0.4  | 21       ------------------[100     ( 03 Jul 2023 22:16 )  3.5  | 0.2  | 127            Lactate, Blood: 1.0 mmol/L (07-01-23 @ 05:09)      Urinalysis Basic - ( 03 Jul 2023 22:16 )    Color: x / Appearance: x / SG: x / pH: x  Gluc: 129 mg/dL / Ketone: x  / Bili: x / Urobili: x   Blood: x / Protein: x / Nitrite: x   Leuk Esterase: x / RBC: x / WBC x   Sq Epi: x / Non Sq Epi: x / Bacteria: x        Culture - Blood (collected 01 Jul 2023 05:09)  Source: .Blood None  Preliminary Report (03 Jul 2023 17:01):    No growth at 48 Hours          RADIOLOGY & ADDITIONAL TESTS:  < from: MR MRCP w/wo IV Cont (07.02.23 @ 16:14) >  IMPRESSION:  1. Contracted gallbladder with cholelithiasis and mild gallbladder wall   thickening.  2. No biliary ductal dilatation or evidence of choledocholithiasis.  3. Diffuse hepatic steatosis.    --- End of Report ---    < end of copied text >

## 2023-07-05 ENCOUNTER — TRANSCRIPTION ENCOUNTER (OUTPATIENT)
Age: 38
End: 2023-07-05

## 2023-07-05 ENCOUNTER — RESULT REVIEW (OUTPATIENT)
Age: 38
End: 2023-07-05

## 2023-07-05 LAB
ALBUMIN SERPL ELPH-MCNC: 4.1 G/DL — SIGNIFICANT CHANGE UP (ref 3.5–5.2)
ALP SERPL-CCNC: 112 U/L — SIGNIFICANT CHANGE UP (ref 30–115)
ALT FLD-CCNC: 98 U/L — HIGH (ref 0–41)
ANION GAP SERPL CALC-SCNC: 17 MMOL/L — HIGH (ref 7–14)
APTT BLD: 32.8 SEC — SIGNIFICANT CHANGE UP (ref 27–39.2)
AST SERPL-CCNC: 30 U/L — SIGNIFICANT CHANGE UP (ref 0–41)
BASOPHILS # BLD AUTO: 0.03 K/UL — SIGNIFICANT CHANGE UP (ref 0–0.2)
BASOPHILS NFR BLD AUTO: 0.4 % — SIGNIFICANT CHANGE UP (ref 0–1)
BILIRUB SERPL-MCNC: 0.5 MG/DL — SIGNIFICANT CHANGE UP (ref 0.2–1.2)
BUN SERPL-MCNC: 4 MG/DL — LOW (ref 10–20)
CALCIUM SERPL-MCNC: 9 MG/DL — SIGNIFICANT CHANGE UP (ref 8.4–10.5)
CHLORIDE SERPL-SCNC: 102 MMOL/L — SIGNIFICANT CHANGE UP (ref 98–110)
CO2 SERPL-SCNC: 20 MMOL/L — SIGNIFICANT CHANGE UP (ref 17–32)
CREAT SERPL-MCNC: 0.5 MG/DL — LOW (ref 0.7–1.5)
EGFR: 124 ML/MIN/1.73M2 — SIGNIFICANT CHANGE UP
EOSINOPHIL # BLD AUTO: 0.14 K/UL — SIGNIFICANT CHANGE UP (ref 0–0.7)
EOSINOPHIL NFR BLD AUTO: 1.7 % — SIGNIFICANT CHANGE UP (ref 0–8)
GLUCOSE SERPL-MCNC: 86 MG/DL — SIGNIFICANT CHANGE UP (ref 70–99)
HCT VFR BLD CALC: 39.7 % — SIGNIFICANT CHANGE UP (ref 37–47)
HGB BLD-MCNC: 13.7 G/DL — SIGNIFICANT CHANGE UP (ref 12–16)
IMM GRANULOCYTES NFR BLD AUTO: 0.5 % — HIGH (ref 0.1–0.3)
LYMPHOCYTES # BLD AUTO: 2.14 K/UL — SIGNIFICANT CHANGE UP (ref 1.2–3.4)
LYMPHOCYTES # BLD AUTO: 26.2 % — SIGNIFICANT CHANGE UP (ref 20.5–51.1)
MAGNESIUM SERPL-MCNC: 2.1 MG/DL — SIGNIFICANT CHANGE UP (ref 1.8–2.4)
MCHC RBC-ENTMCNC: 30.6 PG — SIGNIFICANT CHANGE UP (ref 27–31)
MCHC RBC-ENTMCNC: 34.5 G/DL — SIGNIFICANT CHANGE UP (ref 32–37)
MCV RBC AUTO: 88.8 FL — SIGNIFICANT CHANGE UP (ref 81–99)
MONOCYTES # BLD AUTO: 0.73 K/UL — HIGH (ref 0.1–0.6)
MONOCYTES NFR BLD AUTO: 8.9 % — SIGNIFICANT CHANGE UP (ref 1.7–9.3)
NEUTROPHILS # BLD AUTO: 5.09 K/UL — SIGNIFICANT CHANGE UP (ref 1.4–6.5)
NEUTROPHILS NFR BLD AUTO: 62.3 % — SIGNIFICANT CHANGE UP (ref 42.2–75.2)
NRBC # BLD: 0 /100 WBCS — SIGNIFICANT CHANGE UP (ref 0–0)
PHOSPHATE SERPL-MCNC: 3.3 MG/DL — SIGNIFICANT CHANGE UP (ref 2.1–4.9)
PLATELET # BLD AUTO: 431 K/UL — HIGH (ref 130–400)
PMV BLD: 10 FL — SIGNIFICANT CHANGE UP (ref 7.4–10.4)
POTASSIUM SERPL-MCNC: 4 MMOL/L — SIGNIFICANT CHANGE UP (ref 3.5–5)
POTASSIUM SERPL-SCNC: 4 MMOL/L — SIGNIFICANT CHANGE UP (ref 3.5–5)
PROT SERPL-MCNC: 6.9 G/DL — SIGNIFICANT CHANGE UP (ref 6–8)
RBC # BLD: 4.47 M/UL — SIGNIFICANT CHANGE UP (ref 4.2–5.4)
RBC # FLD: 12.7 % — SIGNIFICANT CHANGE UP (ref 11.5–14.5)
SODIUM SERPL-SCNC: 139 MMOL/L — SIGNIFICANT CHANGE UP (ref 135–146)
WBC # BLD: 8.17 K/UL — SIGNIFICANT CHANGE UP (ref 4.8–10.8)
WBC # FLD AUTO: 8.17 K/UL — SIGNIFICANT CHANGE UP (ref 4.8–10.8)

## 2023-07-05 PROCEDURE — 47562 LAPAROSCOPIC CHOLECYSTECTOMY: CPT

## 2023-07-05 PROCEDURE — 99232 SBSQ HOSP IP/OBS MODERATE 35: CPT

## 2023-07-05 PROCEDURE — 88304 TISSUE EXAM BY PATHOLOGIST: CPT | Mod: 26

## 2023-07-05 PROCEDURE — 99233 SBSQ HOSP IP/OBS HIGH 50: CPT

## 2023-07-05 PROCEDURE — 99232 SBSQ HOSP IP/OBS MODERATE 35: CPT | Mod: 57

## 2023-07-05 RX ORDER — GABAPENTIN 400 MG/1
300 CAPSULE ORAL THREE TIMES A DAY
Refills: 0 | Status: DISCONTINUED | OUTPATIENT
Start: 2023-07-05 | End: 2023-07-06

## 2023-07-05 RX ORDER — ENOXAPARIN SODIUM 100 MG/ML
40 INJECTION SUBCUTANEOUS EVERY 24 HOURS
Refills: 0 | Status: DISCONTINUED | OUTPATIENT
Start: 2023-07-05 | End: 2023-07-05

## 2023-07-05 RX ORDER — HYDROMORPHONE HYDROCHLORIDE 2 MG/ML
0.5 INJECTION INTRAMUSCULAR; INTRAVENOUS; SUBCUTANEOUS
Refills: 0 | Status: DISCONTINUED | OUTPATIENT
Start: 2023-07-05 | End: 2023-07-05

## 2023-07-05 RX ORDER — ENOXAPARIN SODIUM 100 MG/ML
40 INJECTION SUBCUTANEOUS EVERY 24 HOURS
Refills: 0 | Status: DISCONTINUED | OUTPATIENT
Start: 2023-07-06 | End: 2023-07-06

## 2023-07-05 RX ORDER — SODIUM CHLORIDE 9 MG/ML
1000 INJECTION, SOLUTION INTRAVENOUS
Refills: 0 | Status: DISCONTINUED | OUTPATIENT
Start: 2023-07-05 | End: 2023-07-05

## 2023-07-05 RX ORDER — OXYCODONE HYDROCHLORIDE 5 MG/1
5 TABLET ORAL EVERY 6 HOURS
Refills: 0 | Status: DISCONTINUED | OUTPATIENT
Start: 2023-07-05 | End: 2023-07-06

## 2023-07-05 RX ORDER — PANTOPRAZOLE SODIUM 20 MG/1
40 TABLET, DELAYED RELEASE ORAL
Refills: 0 | Status: DISCONTINUED | OUTPATIENT
Start: 2023-07-05 | End: 2023-07-06

## 2023-07-05 RX ORDER — KETOROLAC TROMETHAMINE 30 MG/ML
15 SYRINGE (ML) INJECTION EVERY 6 HOURS
Refills: 0 | Status: DISCONTINUED | OUTPATIENT
Start: 2023-07-05 | End: 2023-07-06

## 2023-07-05 RX ORDER — ONDANSETRON 8 MG/1
4 TABLET, FILM COATED ORAL ONCE
Refills: 0 | Status: COMPLETED | OUTPATIENT
Start: 2023-07-05 | End: 2023-07-05

## 2023-07-05 RX ORDER — METHOCARBAMOL 500 MG/1
750 TABLET, FILM COATED ORAL
Refills: 0 | Status: DISCONTINUED | OUTPATIENT
Start: 2023-07-05 | End: 2023-07-06

## 2023-07-05 RX ORDER — ACETAMINOPHEN 500 MG
650 TABLET ORAL EVERY 6 HOURS
Refills: 0 | Status: DISCONTINUED | OUTPATIENT
Start: 2023-07-05 | End: 2023-07-06

## 2023-07-05 RX ADMIN — GABAPENTIN 300 MILLIGRAM(S): 400 CAPSULE ORAL at 21:54

## 2023-07-05 RX ADMIN — CHLORHEXIDINE GLUCONATE 1 APPLICATION(S): 213 SOLUTION TOPICAL at 11:18

## 2023-07-05 RX ADMIN — Medication 100 MILLIGRAM(S): at 05:06

## 2023-07-05 RX ADMIN — ONDANSETRON 4 MILLIGRAM(S): 8 TABLET, FILM COATED ORAL at 21:27

## 2023-07-05 RX ADMIN — Medication 100 MILLIGRAM(S): at 13:02

## 2023-07-05 RX ADMIN — SODIUM CHLORIDE 125 MILLILITER(S): 9 INJECTION, SOLUTION INTRAVENOUS at 21:27

## 2023-07-05 RX ADMIN — SODIUM CHLORIDE 75 MILLILITER(S): 9 INJECTION, SOLUTION INTRAVENOUS at 05:06

## 2023-07-05 NOTE — DIETITIAN INITIAL EVALUATION ADULT - ORAL NUTRITION SUPPLEMENTS
If diet advanced to clear liquids again, would recommend Ensure Clears 3x/day (240 kcal, 8 gm Protein each)

## 2023-07-05 NOTE — DIETITIAN INITIAL EVALUATION ADULT - OTHER INFO
Patient is 38 y/o F w/ PMHx of pancreatitis and gallstones. Presented to ED on 6/30/23 with abdominal pain and diagnosed with gallstone pancreatitis  -Planned for laparoscopic cholecystectomy today  - NPO, IVF

## 2023-07-05 NOTE — PROGRESS NOTE ADULT - ASSESSMENT
37 year-old Female with past medical history of pancreatitis and gallstones presents with complaint of abdominal pain.  Patient states abdominal pain has been present for 3 days. Patient is being admitted for gallstone pancreatitis.   -Was hospitalized from 05/05/23 to 05/08/2023, for similar symptoms and was diagnosed with Gallstone Pancreatitis. She had EUS on 5/8 which showed no cbd stone, cbd 7mm and was discharged with plan for cholecystectomy as OP.   This admission: patient is tachycardic,  Lipase >3000 ALK Phos 199/ / / TB 1.8/ . CT AP showed Peripancreatic fat stranding without discrete fluid collection, suggesting mild pancreatitis. US showed Echogenic liver compatible with hepatic steatosis. Cholelithiasis and biliary sludge. Mild wall thickening and surrounding inflammatory changes.  pancreatitis     #)Gall stone pancreatitis (Second episode)-improved   #)?acute cholecystitis   -Lipase >3000 and pain, CT scan abdomen positive   -first episode in may 2023   -She had EUS on 5/8 which showed no cbd stone, cbd 7mm and was discharged with plan for cholecystectomy as OP  -CT abdomen showed Peripancreatic fat stranding without discrete fluid collection, suggesting mild pancreatitis.  -US showed +GS, sludge and GB wall thickening   -Admitted with inc LFT 1.8/199/220/639 trending down   -hemodynamically stable   -Tolerating diet   -Abdominal pain resolved   -MRCP negative for CBD stone        Recs:  low fat diet from Gi stand point   pain control as needed  OR today as per surgery     #)Hepatic steatosis likely NAFLD  -US showed hepatic steatosis   -Advised on diet and life style modifications   -Trend LFT, INR  -Avoid hepatotoxic medications   -Follow up with hepatology as an OP    #)Epiploic appendigitis mid descending colon   -there are inflammatory changes surrounding the mid descending colon with a fat-containing appendage contiguous to it consistent with epiploic   appendigitis  -Follow up with surgery     recall as needed Detail Level: Detailed

## 2023-07-05 NOTE — DIETITIAN INITIAL EVALUATION ADULT - PERTINENT MEDS FT
MEDICATIONS  (STANDING):  cefTRIAXone   IVPB 2000 milliGRAM(s) IV Intermittent every 24 hours  chlorhexidine 2% Cloths 1 Application(s) Topical daily  lactated ringers. 1000 milliLiter(s) (75 mL/Hr) IV Continuous <Continuous>  metroNIDAZOLE  IVPB 500 milliGRAM(s) IV Intermittent every 8 hours  polyethylene glycol 3350 17 Gram(s) Oral daily  senna 2 Tablet(s) Oral at bedtime    MEDICATIONS  (PRN):  ketorolac   Injectable 30 milliGRAM(s) IV Push every 6 hours PRN Severe Pain (7 - 10)  ondansetron Injectable 4 milliGRAM(s) IV Push every 8 hours PRN Nausea and/or Vomiting

## 2023-07-05 NOTE — PROGRESS NOTE ADULT - SUBJECTIVE AND OBJECTIVE BOX
KATI RAYMUNDO 37y Female  MRN#: [id number]   Hospital Day: 5d    HPI:  37 year-old Female with past medical history of pancreatitis and gallstones presents with complaint of abdominal pain.  Patient states abdominal pain has been present for 3 days. She describes the pain as sharp localized to the epigastric region as well as the left upper quadrant. She stated that the pain after she had peach.  She endorses that the pain occasionally radiates to the back between the shoulder blades.  She states that the pain has been associated with 4 episodes of nonbilious nonbloody vomiting yesterday and 4 episodes today.  Patient states pain is worse with meals, comes and goes. She initially stated that she thought it was food poisoning but since her symptoms did not resolve she decided to come to the ED. Denies any urinary symptoms.   Denies fever/chills, chest pain, shortness of breath, diarrhea, lightheadedness/weakness, numbness/tingling. States that this feels different from her previous episode of pancreatitis as the pain was more right sided at that time.    Of note patient was hospitalized from 05/05/23 to 05/08/2023, for similar symptoms and was diagnosed with Gallstone Pancreatitis. She had ERCP/EUS on 5/8 which showed no cbd stone, cbd 7mm and was discharged with plan for cholecystectomy as OP.    In ED:   VS were /81, . RR 18, T 97.8, SPO2 95% on RA.   Labs were  significant for:   WBC of 14k , AG 17  UA positive for large ketones, nitrites, LE and bacteria  Lipase >3000   ALK Phos 199/ / / TB 1.8/ DB 1    EKG showed Sinus tachycardia     CXR unremarkable  CT AP showed1.  Peripancreatic fat stranding without discrete fluid collection, suggesting mild pancreatitis. 2.  there are inflammatory changes surrounding the mid descending colon   with a fat-containing appendage contiguous to it consistent with epiploic appendicitis    US showed Echogenic liver compatible with hepatic steatosis. Cholelithiasis and biliary sludge. Mild wall thickening and surrounding inflammatory changes. Findings suggesting acute cholecystitis.  Patient received ceftriaxone and flagyl x1, 1L LR bolus     Patient is being admitted to the ICU for gallstone pancreatitis    (30 Jun 2023 20:19)      SUBJECTIVE  Patient is a 37y old Female who presents with a chief complaint of Gallstone Pancreatitis (05 Jul 2023 13:15)  Currently admitted to medicine with the primary diagnosis of Pancreatitis      INTERVAL HPI AND OVERNIGHT EVENTS:  Patient was examined and seen at bedside. This morning she is resting comfortably in bed and reports no issues or overnight events.    REVIEW OF SYMPTOMS:  CONSTITUTIONAL: No weakness, fevers or chills; No headaches  EYES: No visual changes, eye pain, or discharge  ENT: No vertigo; No ear pain or change in hearing; No sore throat or difficulty swallowing  NECK: No pain or stiffness  RESPIRATORY: No cough, wheezing, or hemoptysis; No shortness of breath  CARDIOVASCULAR: No chest pain or palpitations  GASTROINTESTINAL: No abdominal or epigastric pain; No nausea, vomiting, or hematemesis; No diarrhea or constipation; No melena or hematochezia  GENITOURINARY: No dysuria, frequency or hematuria  MUSCULOSKELETAL: No joint pain, no muscle pain, no weakness  NEUROLOGICAL: No numbness or weakness  SKIN: No itching or rashes    OBJECTIVE  PAST MEDICAL & SURGICAL HISTORY  Pancreatitis      ALLERGIES:  No Known Allergies    MEDICATIONS:  STANDING MEDICATIONS  cefTRIAXone   IVPB 2000 milliGRAM(s) IV Intermittent every 24 hours  chlorhexidine 2% Cloths 1 Application(s) Topical daily  enoxaparin Injectable 40 milliGRAM(s) SubCutaneous every 24 hours  lactated ringers. 1000 milliLiter(s) IV Continuous <Continuous>  metroNIDAZOLE  IVPB 500 milliGRAM(s) IV Intermittent every 8 hours  polyethylene glycol 3350 17 Gram(s) Oral daily  senna 2 Tablet(s) Oral at bedtime    PRN MEDICATIONS  ketorolac   Injectable 30 milliGRAM(s) IV Push every 6 hours PRN  ondansetron Injectable 4 milliGRAM(s) IV Push every 8 hours PRN      PHYSICAL EXAM:  CONSTITUTIONAL: No acute distress, well-developed, well-groomed, AAOx3  HEAD: Atraumatic, normocephalic  EYES: EOM intact, PERRLA, conjunctiva and sclera clear  ENT: Supple, no masses, no thyromegaly, no bruits, no JVD; moist mucous membranes  PULMONARY: Clear to auscultation bilaterally; no wheezes, rales, or rhonchi  CARDIOVASCULAR: Regular rate and rhythm; no murmurs, rubs, or gallops  GASTROINTESTINAL: Soft, non-tender, non-distended; bowel sounds present  MUSCULOSKELETAL: 2+ peripheral pulses; no clubbing, no cyanosis, no edema  NEUROLOGY: non-focal  SKIN: No rashes or lesions; warm and dry    VITAL SIGNS: Last 24 Hours  T(C): 36.8 (05 Jul 2023 08:34), Max: 36.8 (05 Jul 2023 08:34)  T(F): 98.3 (05 Jul 2023 08:34), Max: 98.3 (05 Jul 2023 08:34)  HR: 84 (05 Jul 2023 08:34) (74 - 88)  BP: 126/63 (05 Jul 2023 08:34) (125/74 - 134/78)  BP(mean): --  RR: 18 (05 Jul 2023 08:34) (18 - 18)  SpO2: 98% (05 Jul 2023 08:34) (96% - 98%)    LABS:                        13.7   8.17  )-----------( 431      ( 05 Jul 2023 12:40 )             39.7     07-05    139  |  102  |  4<L>  ----------------------------<  86  4.0   |  20  |  0.5<L>    Ca    9.0      05 Jul 2023 12:40  Phos  3.3     07-05  Mg     2.1     07-05    TPro  6.9  /  Alb  4.1  /  TBili  0.5  /  DBili  0.2  /  AST  30  /  ALT  98<H>  /  AlkPhos  112  07-05    PT/INR - ( 04 Jul 2023 12:04 )   PT: 16.70 sec;   INR: 1.45 ratio         PTT - ( 05 Jul 2023 12:40 )  PTT:32.8 sec  Urinalysis Basic - ( 05 Jul 2023 12:40 )    Color: x / Appearance: x / SG: x / pH: x  Gluc: 86 mg/dL / Ketone: x  / Bili: x / Urobili: x   Blood: x / Protein: x / Nitrite: x   Leuk Esterase: x / RBC: x / WBC x   Sq Epi: x / Non Sq Epi: x / Bacteria: x          ASSESSMENT & PLAN    37 year-old Female with past medical history of pancreatitis and gallstones presents with complaint of abdominal pain.  Found to have gallstone pancreatitis.      ·Gallstone pancreatitis     SIRS on admission wbc 14, tachy 114  ·Lipase 3K to 600  Started with fluids and ceftriaxone and metronidazole   USG : Cholelithiasis and biliary sludge. Mild wall thickening and surrounding   inflammatory changes. Findings suggesting acute cholecystitis.    ·CT consistent with pancreatitis & Epiploic appendagitis mid descending colon  , hepatic steatosis  · MRCP 07/02-  cholelithiasis.No biliary ductal dilatation or evidence of choledocholithiasis.Diffuse hepatic steatosis. Plan for CCY as per surgery consult  ·LFTS downtrending.   -IVF rate @ 100ml/hr   -cholecystectomy done    #)Epiploic appendigitis mid descending colon   -there are inflammatory changes surrounding the mid descending colon with a fat-containing appendage contiguous to it consistent with epiploic   appendigitis  -Follow up with surgery       #Class ii obesity , BMI 38  Impaired Glucose< HbA1C 6.1   - starting with metformin 500mg and liptor 20mg at discharge    # Hepatic steatosis   - Follow up with our Hepatology Clinic located at [address] [place] [address]. Phone Number: [phone]     #Misc  - DVT Prophylaxis: hold  - Diet: clear fluid  - Activity: As tolerated  - IV Fluids: 75ml/hr  - Code Status: full  - Dispo: CCY   -

## 2023-07-05 NOTE — PROGRESS NOTE ADULT - SUBJECTIVE AND OBJECTIVE BOX
Gastroenterology progress note:     Patient is a 37y old  Female who presents with a chief complaint of Cholecystitis     (05 Jul 2023 10:12)       Admitted on: 06-30-23    We are following the patient for pancreatitis        Interval History:  pain is resolved   no nausea, no vomiting   able to tolerate diet        PAST MEDICAL & SURGICAL HISTORY:  Pancreatitis          MEDICATIONS  (STANDING):  cefTRIAXone   IVPB 2000 milliGRAM(s) IV Intermittent every 24 hours  chlorhexidine 2% Cloths 1 Application(s) Topical daily  enoxaparin Injectable 40 milliGRAM(s) SubCutaneous every 24 hours  lactated ringers. 1000 milliLiter(s) (75 mL/Hr) IV Continuous <Continuous>  metroNIDAZOLE  IVPB 500 milliGRAM(s) IV Intermittent every 8 hours  polyethylene glycol 3350 17 Gram(s) Oral daily  senna 2 Tablet(s) Oral at bedtime    MEDICATIONS  (PRN):  ketorolac   Injectable 30 milliGRAM(s) IV Push every 6 hours PRN Severe Pain (7 - 10)  ondansetron Injectable 4 milliGRAM(s) IV Push every 8 hours PRN Nausea and/or Vomiting      Allergies  No Known Allergies      Review of Systems:   Cardiovascular:  No Chest Pain, No Palpitations  Respiratory:  No Cough, No Dyspnea  Gastrointestinal:  As described in HPI    Physical Examination:  T(C): 36.8 (07-05-23 @ 08:34), Max: 36.8 (07-05-23 @ 08:34)  HR: 84 (07-05-23 @ 08:34) (74 - 88)  BP: 126/63 (07-05-23 @ 08:34) (125/74 - 134/78)  RR: 18 (07-05-23 @ 08:34) (18 - 18)  SpO2: 98% (07-05-23 @ 08:34) (96% - 98%)      Constitutional: No acute distress.  Respiratory:  No signs of respiratory distress. Lung sounds are clear bilaterally.  Cardiovascular:  S1 S2, Regular rate and rhythm.  Abdominal: Abdomen is soft, symmetric, and non-tender without distention.        Data:                        13.7   8.17  )-----------( x        ( 05 Jul 2023 12:40 )             39.7     Hgb trend:  13.7  07-05-23 @ 12:40  11.3  07-03-23 @ 22:16  12.4  07-03-23 @ 08:21        07-03    136  |  101  |  4<L>  ----------------------------<  129<H>  3.7   |  22  |  0.5<L>    Ca    8.3<L>      03 Jul 2023 22:16  Phos  2.7     07-03  Mg     2.0     07-03    TPro  5.9<L>  /  Alb  3.5  /  TBili  0.4  /  DBili  0.2  /  AST  21  /  ALT  127<H>  /  AlkPhos  100  07-03    Liver panel trend:  TBili 0.4   /   AST 21   /      /   AlkP 100   /   Tptn 5.9   /   Alb 3.5    /   DBili 0.2      07-03  TBili 0.5   /   AST 27   /      /   AlkP 115   /   Tptn 6.3   /   Alb 3.5    /   DBili --      07-03  TBili 0.8   /   AST 43   /      /   AlkP 113   /   Tptn 5.7   /   Alb 3.3    /   DBili --      07-02  TBili 1.4   /   AST 99   /      /   AlkP 136   /   Tptn 6.1   /   Alb 3.6    /   DBili 0.6      07-01  TBili 1.8   /      /      /   AlkP 199   /   Tptn 7.7   /   Alb 4.5    /   DBili 1.0      06-30      PT/INR - ( 04 Jul 2023 12:04 )   PT: 16.70 sec;   INR: 1.45 ratio         PTT - ( 04 Jul 2023 12:04 )  PTT:31.7 sec       Radiology:

## 2023-07-05 NOTE — DIETITIAN INITIAL EVALUATION ADULT - PERTINENT LABORATORY DATA
07-03    136  |  101  |  4<L>  ----------------------------<  129<H>  3.7   |  22  |  0.5<L>    Ca    8.3<L>      03 Jul 2023 22:16  Phos  2.7     07-03  Mg     2.0     07-03    TPro  5.9<L>  /  Alb  3.5  /  TBili  0.4  /  DBili  0.2  /  AST  21  /  ALT  127<H>  /  AlkPhos  100  07-03  A1C with Estimated Average Glucose Result: 6.1 % (07-03-23 @ 08:21)

## 2023-07-05 NOTE — PROGRESS NOTE ADULT - SUBJECTIVE AND OBJECTIVE BOX
SUBJECTIVE:    Patient is a 37y old Female who presents with a chief complaint of Gallstone Pancreatitis (05 Jul 2023 14:14)    Currently admitted to medicine with the primary diagnosis of Pancreatitis       Today is hospital day 5d.     PAST MEDICAL & SURGICAL HISTORY  Pancreatitis      ALLERGIES:  No Known Allergies    MEDICATIONS:  STANDING MEDICATIONS  cefTRIAXone   IVPB 2000 milliGRAM(s) IV Intermittent every 24 hours  chlorhexidine 2% Cloths 1 Application(s) Topical daily  enoxaparin Injectable 40 milliGRAM(s) SubCutaneous every 24 hours  lactated ringers. 1000 milliLiter(s) IV Continuous <Continuous>  metroNIDAZOLE  IVPB 500 milliGRAM(s) IV Intermittent every 8 hours  polyethylene glycol 3350 17 Gram(s) Oral daily  senna 2 Tablet(s) Oral at bedtime    PRN MEDICATIONS  ketorolac   Injectable 30 milliGRAM(s) IV Push every 6 hours PRN  ondansetron Injectable 4 milliGRAM(s) IV Push every 8 hours PRN    VITALS:   T(F): 98.3  HR: 84  BP: 135/66  RR: 18  SpO2: 98%    LABS:                        13.7   8.17  )-----------( 431      ( 05 Jul 2023 12:40 )             39.7     07-05    139  |  102  |  4<L>  ----------------------------<  86  4.0   |  20  |  0.5<L>    Ca    9.0      05 Jul 2023 12:40  Phos  3.3     07-05  Mg     2.1     07-05    TPro  6.9  /  Alb  4.1  /  TBili  0.5  /  DBili  0.2  /  AST  30  /  ALT  98<H>  /  AlkPhos  112  07-05    PT/INR - ( 04 Jul 2023 12:04 )   PT: 16.70 sec;   INR: 1.45 ratio         PTT - ( 05 Jul 2023 12:40 )  PTT:32.8 sec  Urinalysis Basic - ( 05 Jul 2023 12:40 )    Color: x / Appearance: x / SG: x / pH: x  Gluc: 86 mg/dL / Ketone: x  / Bili: x / Urobili: x   Blood: x / Protein: x / Nitrite: x   Leuk Esterase: x / RBC: x / WBC x   Sq Epi: x / Non Sq Epi: x / Bacteria: x                RADIOLOGY:    PHYSICAL EXAM:  GEN: No acute distress  LUNGS: Clear to auscultation bilaterally   HEART: S1/S2 present. RRR.   ABD/ GI: Soft, non-tender, non-distended. Bowel sounds present  EXT: NC/NC/NE/2+PP/GORDON  NEURO: AAOX3

## 2023-07-05 NOTE — DIETITIAN INITIAL EVALUATION ADULT - OTHER CALCULATIONS
Energy: 1743 kcal/day (MSJ x 1.0 SF) - with consideration for obese BMI (class 2)   estimated needs with consideration for age, acuity of illness (gallstone pancreatitis), BMI   IBW used for Protein estimations

## 2023-07-05 NOTE — DIETITIAN INITIAL EVALUATION ADULT - ORAL INTAKE PTA/DIET HISTORY
Nutrition hx obtained via [place]  (#[id number]). Patient reports good appetite and PO intake PTA. She was following a healthy diet at home - cooking at home, no soda, no juices. No high fat foods or overly spicy foods. Unsure of UBW - but no reports no weight loss - endorses weight gain x 5 years. NKFA, no food intolerances or other restrictions reported.    Nutrition hx obtained via [place]  (#[id number]). Patient reports usually with good appetite and PO intake PTA. She was following a healthy diet at home - cooking at home, no soda, no juices. No high fat foods or overly spicy foods. Unsure of UBW - but no reports no weight loss - endorses weight gain over the past 5  years - she has been trying to eat healthier with smaller portions - weight gain likely related to pregnancies with her 2 children. NKFA, no food intolerances or other restrictions reported.

## 2023-07-05 NOTE — DIETITIAN INITIAL EVALUATION ADULT - NUTRITIONGOAL OUTCOME1
As medically feasible and tolerated by patient, advance diet to clears, then low fat diet within 3 days

## 2023-07-05 NOTE — CHART NOTE - NSCHARTNOTEFT_GEN_A_CORE
PACU ANESTHESIA ADMISSION NOTE      Procedure: Laparoscopic cholecystectomy      Post op diagnosis:  Gallstone pancreatitis        ____  Intubated  TV:______       Rate: ______      FiO2: ______    __x__  Patent Airway    __x__  Full return of protective reflexes    __x__  Full recovery from anesthesia / back to baseline status    Vitals  HR: 114  BP: 133.70  RR: 18  O2 Sat: 96%  Temp: 97.6    Mental Status:  __x__ Awake   ___x__ Alert   _____ Drowsy   _____ Sedated    Nausea/Vomiting:  __x__ NO  ______Yes,   See Post - Op Orders          Pain Scale (0-10):  _____    Treatment: ____ None    __x__ See Post - Op/PCA Orders    Post - Operative Fluids:   ____ Oral   __x__ See Post - Op Orders    Plan: Discharge when criteria met:   ____Home       __x___Floor     _____Critical Care   Other:_________________    Comments: Patient had smooth intraoperative event, no anesthesia complication.

## 2023-07-05 NOTE — PROGRESS NOTE ADULT - ASSESSMENT
36 y/o F w/ PMHx of pancreatitis and gallstones. Presented to ED on 6/30/23 with abdominal pain and diagnosed with gallstone pancreatitis.     PLAN:   - Planned for laparoscopic cholecystectomy today  - NPO, IVF  - F/U OR     [phone]

## 2023-07-05 NOTE — PROGRESS NOTE ADULT - ASSESSMENT
37 year-old Female with past medical history of pancreatitis and gallstones presents with complaint of abdominal pain.  Found to have gallstone pancreatitis.      Gallstone pancreatitis --OR scheduled Wednesday for lap cholecystectomy   Class ii obesity , BMI 38  Pre-DM2 HbA1c 6.1  Dyslipidemia         CT consistent with pancreatitis & Epiploic appendagitis mid descending colon  , hepatic steatosis   MRCP 07/02-  cholelithiasis.No biliary ductal dilatation or evidence of choledocholithiasis.Diffuse hepatic steatosis.  LFTS downtrending.   continue with ceftriaxone and flagyl. Possible acute cystitis vs just pyuria as no dysuria, UA positive( pt on Rocephin anyway)  HbA1c 6.1. Diabetic diet. Lifestyle changes and recommended metformin and Lipitor on discharge  Lipase 3K to 75. D/c IVF  Plan for CCY  today

## 2023-07-05 NOTE — DIETITIAN INITIAL EVALUATION ADULT - NS FNS DIET ORDER
Diet, NPO after Midnight:      NPO Start Date: 04-Jul-2023,   NPO Start Time: 23:59  Except Medications (07-04-23 @ 08:30) [Active]  Diet, Clear Liquid (07-02-23 @ 11:19) [Active]

## 2023-07-05 NOTE — BRIEF OPERATIVE NOTE - OPERATION/FINDINGS
Critical view obtained, cystic duct and cystic artery clipped and sharply divided. 0-PDS endoloop also placed below clips on cystic duct.

## 2023-07-06 ENCOUNTER — TRANSCRIPTION ENCOUNTER (OUTPATIENT)
Age: 38
End: 2023-07-06

## 2023-07-06 VITALS
RESPIRATION RATE: 18 BRPM | HEART RATE: 87 BPM | DIASTOLIC BLOOD PRESSURE: 65 MMHG | SYSTOLIC BLOOD PRESSURE: 115 MMHG | TEMPERATURE: 98 F

## 2023-07-06 LAB
ALBUMIN SERPL ELPH-MCNC: 3.8 G/DL — SIGNIFICANT CHANGE UP (ref 3.5–5.2)
ALP SERPL-CCNC: 102 U/L — SIGNIFICANT CHANGE UP (ref 30–115)
ALT FLD-CCNC: 85 U/L — HIGH (ref 0–41)
ANION GAP SERPL CALC-SCNC: 15 MMOL/L — HIGH (ref 7–14)
AST SERPL-CCNC: 42 U/L — HIGH (ref 0–41)
BASOPHILS # BLD AUTO: 0.02 K/UL — SIGNIFICANT CHANGE UP (ref 0–0.2)
BASOPHILS NFR BLD AUTO: 0.2 % — SIGNIFICANT CHANGE UP (ref 0–1)
BILIRUB DIRECT SERPL-MCNC: 0.2 MG/DL — SIGNIFICANT CHANGE UP (ref 0–0.3)
BILIRUB INDIRECT FLD-MCNC: 0.2 MG/DL — SIGNIFICANT CHANGE UP (ref 0.2–1.2)
BILIRUB SERPL-MCNC: 0.4 MG/DL — SIGNIFICANT CHANGE UP (ref 0.2–1.2)
BUN SERPL-MCNC: 5 MG/DL — LOW (ref 10–20)
CALCIUM SERPL-MCNC: 8.5 MG/DL — SIGNIFICANT CHANGE UP (ref 8.4–10.5)
CHLORIDE SERPL-SCNC: 100 MMOL/L — SIGNIFICANT CHANGE UP (ref 98–110)
CO2 SERPL-SCNC: 19 MMOL/L — SIGNIFICANT CHANGE UP (ref 17–32)
CREAT SERPL-MCNC: 0.6 MG/DL — LOW (ref 0.7–1.5)
CULTURE RESULTS: SIGNIFICANT CHANGE UP
CULTURE RESULTS: SIGNIFICANT CHANGE UP
EGFR: 118 ML/MIN/1.73M2 — SIGNIFICANT CHANGE UP
EOSINOPHIL # BLD AUTO: 0 K/UL — SIGNIFICANT CHANGE UP (ref 0–0.7)
EOSINOPHIL NFR BLD AUTO: 0 % — SIGNIFICANT CHANGE UP (ref 0–8)
GLUCOSE SERPL-MCNC: 164 MG/DL — HIGH (ref 70–99)
HCT VFR BLD CALC: 37.2 % — SIGNIFICANT CHANGE UP (ref 37–47)
HGB BLD-MCNC: 12.7 G/DL — SIGNIFICANT CHANGE UP (ref 12–16)
IMM GRANULOCYTES NFR BLD AUTO: 0.5 % — HIGH (ref 0.1–0.3)
LYMPHOCYTES # BLD AUTO: 0.9 K/UL — LOW (ref 1.2–3.4)
LYMPHOCYTES # BLD AUTO: 8.8 % — LOW (ref 20.5–51.1)
MAGNESIUM SERPL-MCNC: 2 MG/DL — SIGNIFICANT CHANGE UP (ref 1.8–2.4)
MCHC RBC-ENTMCNC: 30.4 PG — SIGNIFICANT CHANGE UP (ref 27–31)
MCHC RBC-ENTMCNC: 34.1 G/DL — SIGNIFICANT CHANGE UP (ref 32–37)
MCV RBC AUTO: 89 FL — SIGNIFICANT CHANGE UP (ref 81–99)
MONOCYTES # BLD AUTO: 0.18 K/UL — SIGNIFICANT CHANGE UP (ref 0.1–0.6)
MONOCYTES NFR BLD AUTO: 1.8 % — SIGNIFICANT CHANGE UP (ref 1.7–9.3)
NEUTROPHILS # BLD AUTO: 9.06 K/UL — HIGH (ref 1.4–6.5)
NEUTROPHILS NFR BLD AUTO: 88.7 % — HIGH (ref 42.2–75.2)
NRBC # BLD: 0 /100 WBCS — SIGNIFICANT CHANGE UP (ref 0–0)
PHOSPHATE SERPL-MCNC: 3.6 MG/DL — SIGNIFICANT CHANGE UP (ref 2.1–4.9)
PLATELET # BLD AUTO: 404 K/UL — HIGH (ref 130–400)
PMV BLD: 10 FL — SIGNIFICANT CHANGE UP (ref 7.4–10.4)
POTASSIUM SERPL-MCNC: 4.9 MMOL/L — SIGNIFICANT CHANGE UP (ref 3.5–5)
POTASSIUM SERPL-SCNC: 4.9 MMOL/L — SIGNIFICANT CHANGE UP (ref 3.5–5)
PROT SERPL-MCNC: 6.6 G/DL — SIGNIFICANT CHANGE UP (ref 6–8)
RBC # BLD: 4.18 M/UL — LOW (ref 4.2–5.4)
RBC # FLD: 12.5 % — SIGNIFICANT CHANGE UP (ref 11.5–14.5)
SODIUM SERPL-SCNC: 134 MMOL/L — LOW (ref 135–146)
SPECIMEN SOURCE: SIGNIFICANT CHANGE UP
SPECIMEN SOURCE: SIGNIFICANT CHANGE UP
WBC # BLD: 10.21 K/UL — SIGNIFICANT CHANGE UP (ref 4.8–10.8)
WBC # FLD AUTO: 10.21 K/UL — SIGNIFICANT CHANGE UP (ref 4.8–10.8)

## 2023-07-06 RX ADMIN — METHOCARBAMOL 750 MILLIGRAM(S): 500 TABLET, FILM COATED ORAL at 00:27

## 2023-07-06 RX ADMIN — Medication 650 MILLIGRAM(S): at 06:06

## 2023-07-06 RX ADMIN — ENOXAPARIN SODIUM 40 MILLIGRAM(S): 100 INJECTION SUBCUTANEOUS at 08:31

## 2023-07-06 RX ADMIN — Medication 650 MILLIGRAM(S): at 00:27

## 2023-07-06 RX ADMIN — PANTOPRAZOLE SODIUM 40 MILLIGRAM(S): 20 TABLET, DELAYED RELEASE ORAL at 06:07

## 2023-07-06 RX ADMIN — Medication 650 MILLIGRAM(S): at 07:21

## 2023-07-06 RX ADMIN — METHOCARBAMOL 750 MILLIGRAM(S): 500 TABLET, FILM COATED ORAL at 06:06

## 2023-07-06 RX ADMIN — GABAPENTIN 300 MILLIGRAM(S): 400 CAPSULE ORAL at 06:06

## 2023-07-06 RX ADMIN — Medication 650 MILLIGRAM(S): at 02:36

## 2023-07-06 NOTE — DISCHARGE NOTE NURSING/CASE MANAGEMENT/SOCIAL WORK - NSDCPEFALRISK_GEN_ALL_CORE
For information on Fall & Injury Prevention, visit: [email or web address] OR  [email or web address] OR  https://www.cdc.gov/steadi/patient.html

## 2023-07-06 NOTE — PROGRESS NOTE ADULT - PROVIDER SPECIALTY LIST ADULT
Internal Medicine
Surgery
Gastroenterology
Hospitalist
Surgery
Hospitalist
Internal Medicine
Surgery
Surgery
Gastroenterology
Gastroenterology
MICU

## 2023-07-06 NOTE — DISCHARGE NOTE PROVIDER - CARE PROVIDER_API CALL
Raisa Gimenez  Surgical Critical Care  [address] [place] [address] [address] [place] [address]  Phone: [phone]  Fax: [phone]  Follow Up Time: 1 week

## 2023-07-06 NOTE — DISCHARGE NOTE PROVIDER - HOSPITAL COURSE
37 year-old Female with past medical history of pancreatitis and gallstones presented to ED on 6/30/23 with complaint of abdominal pain.  Patient states abdominal pain has been present for 3 days. She describes the pain as sharp localized to the epigastric region as well as the left upper quadrant. She stated that the pain after she had peach.  She endorses that the pain occasionally radiates to the back between the shoulder blades.  She states that the pain has been associated with 4 episodes of nonbilious nonbloody vomiting the day before, and 4 episodes on the day of presentation.  Patient states pain is worse with meals, comes and goes. She initially stated that she thought it was food poisoning but since her symptoms did not resolve she decided to come to the ED. Denies any urinary symptoms.   Denies fever/chills, chest pain, shortness of breath, diarrhea, lightheadedness/weakness, numbness/tingling. States that this feels different from her previous episode of pancreatitis as the pain was more right sided at that time.    Of note patient was hospitalized from 05/05/23 to 05/08/2023, for similar symptoms and was diagnosed with Gallstone Pancreatitis. She had ERCP/EUS on 5/8 which showed no cbd stone, cbd 7mm and was discharged with plan for cholecystectomy as OP.    In ED:   VS were /81, . RR 18, T 97.8, SPO2 95% on RA.   Labs were  significant for:   WBC of 14k , AG 17  UA positive for large ketones, nitrites, LE and bacteria  Lipase >3000   ALK Phos 199/ / / TB 1.8/ DB 1    EKG showed Sinus tachycardia     CXR unremarkable  CT AP showed peripancreatic fat stranding without discrete fluid collection, suggesting mild pancreatitis. There were inflammatory changes surrounding the mid descending colon with a fat-containing appendage contiguous to it consistent with epiploic appendicitis.     US showed Echogenic liver compatible with hepatic steatosis. Cholelithiasis and biliary sludge. Mild wall thickening and surrounding inflammatory changes. Findings suggesting acute cholecystitis.  Patient received ceftriaxone and flagyl x1, 1L LR bolus     General surgery consulted for management of recurrent gallstone pancreatitis. Patient was placed on IV ceftriaxone and metronidazole. She underwent laparoscopic cholecystectomy on 7/5/23. She tolerated the procedure well. Arrived to floor in stable condition. Tolerating low-fat diet. Ambulating without difficulty. No complaints of pain. Patient is cleared for discharge to home from a surgical standpoint. 37 year-old Female with past medical history of pancreatitis and gallstones presented to ED on 6/30/23 with complaint of abdominal pain.  Patient states abdominal pain has been present for 3 days. She describes the pain as sharp localized to the epigastric region as well as the left upper quadrant. She stated that the pain after she had peach.  She endorses that the pain occasionally radiates to the back between the shoulder blades.  She states that the pain has been associated with 4 episodes of nonbilious nonbloody vomiting the day before, and 4 episodes on the day of presentation.  Patient states pain is worse with meals, comes and goes. She initially stated that she thought it was food poisoning but since her symptoms did not resolve she decided to come to the ED. Denies any urinary symptoms.   Denies fever/chills, chest pain, shortness of breath, diarrhea, lightheadedness/weakness, numbness/tingling. States that this feels different from her previous episode of pancreatitis as the pain was more right sided at that time.    Of note patient was hospitalized from 05/05/23 to 05/08/2023, for similar symptoms and was diagnosed with Gallstone Pancreatitis. She had ERCP/EUS on 5/8 which showed no cbd stone, cbd 7mm and was discharged with plan for cholecystectomy as OP.    In ED:   VS were /81, . RR 18, T 97.8, SPO2 95% on RA.   Labs were  significant for:   WBC of 14k , AG 17  UA positive for large ketones, nitrites, LE and bacteria  Lipase >3000   ALK Phos 199/ / / TB 1.8/ DB 1    EKG showed Sinus tachycardia     CXR unremarkable  CT AP showed peripancreatic fat stranding without discrete fluid collection, suggesting mild pancreatitis. There were inflammatory changes surrounding the mid descending colon with a fat-containing appendage contiguous to it consistent with epiploic appendicitis.     US showed Echogenic liver compatible with hepatic steatosis. Cholelithiasis and biliary sludge. Mild wall thickening and surrounding inflammatory changes. Findings suggesting acute cholecystitis.  Patient received ceftriaxone and flagyl x1, 1L LR bolus     General surgery consulted for management of recurrent gallstone pancreatitis. Patient was placed on IV ceftriaxone and metronidazole. MR MRCP w/wo IV Cont done on 7/2/23 showed contracted gallbladder with cholelithiasis and mild gallbladder wall thickening; No biliary ductal dilatation or evidence of choledocholithiasis; Diffuse hepatic steatosis. Patient underwent laparoscopic cholecystectomy on 7/5/23. She tolerated the procedure well. Arrived to floor in stable condition. Tolerating low-fat diet. Ambulating without difficulty. No complaints of pain. Patient is cleared for discharge to home from a surgical standpoint.

## 2023-07-06 NOTE — PROGRESS NOTE ADULT - SUBJECTIVE AND OBJECTIVE BOX
GENERAL SURGERY PROGRESS NOTE    Patient: KATI RAYMUNDO , 37y ([birth year])Female   MRN: [id number]  Location: [address]  Visit: 06-30-23 Inpatient  Date: 07-06-23 @ 10:43    Hospital Day #: 7    Events of past 24 hours: Post operative check completed during rounds. Patient non tender, tolerating diet    PAST MEDICAL & SURGICAL HISTORY:  Pancreatitis          Vitals:   T(F): 97.9 (07-06-23 @ 08:16), Max: 98.6 (07-05-23 @ 21:15)  HR: 87 (07-06-23 @ 08:16)  BP: 115/65 (07-06-23 @ 08:16)  RR: 18 (07-06-23 @ 08:16)  SpO2: 98% (07-06-23 @ 00:16)      Diet, Regular:   Low Fat (LOWFAT)      Fluids:     I & O's:    Bowel Movement: : [] YES [] NO  Flatus: : [] YES [] NO    PHYSICAL EXAM:  General: NAD, AAOx3,   HEENT: NCAT, JULISSA, EOMI,  Cardiac: RRR  Respiratory: CTAB, normal respiratory effort,   Abdomen: Soft, non-distended, non-tender,  Skin: Warm/dry, normal color, no jaundice  Incision/wound: healing well, dressings in place, clean, dry and intact    MEDICATIONS  (STANDING):  acetaminophen     Tablet .. 650 milliGRAM(s) Oral every 6 hours  enoxaparin Injectable 40 milliGRAM(s) SubCutaneous every 24 hours  gabapentin 300 milliGRAM(s) Oral three times a day  lactated ringers. 1000 milliLiter(s) (75 mL/Hr) IV Continuous <Continuous>  methocarbamol 750 milliGRAM(s) Oral four times a day  pantoprazole    Tablet 40 milliGRAM(s) Oral before breakfast    MEDICATIONS  (PRN):  ketorolac   Injectable 15 milliGRAM(s) IV Push every 6 hours PRN Moderate Pain (4 - 6)  oxyCODONE    IR 5 milliGRAM(s) Oral every 6 hours PRN Severe Pain (7 - 10)      DVT PROPHYLAXIS: enoxaparin Injectable 40 milliGRAM(s) SubCutaneous every 24 hours    GI PROPHYLAXIS: pantoprazole    Tablet 40 milliGRAM(s) Oral before breakfast    ANTICOAGULATION:   ANTIBIOTICS:            LAB/STUDIES:  Labs:  CAPILLARY BLOOD GLUCOSE                              12.7   10.21 )-----------( 404      ( 06 Jul 2023 01:58 )             37.2       Auto Neutrophil %: 88.7 % (07-06-23 @ 01:58)  Auto Immature Granulocyte %: 0.5 % (07-06-23 @ 01:58)  Auto Neutrophil %: 62.3 % (07-05-23 @ 12:40)  Auto Immature Granulocyte %: 0.5 % (07-05-23 @ 12:40)    07-06    134<L>  |  100  |  5<L>  ----------------------------<  164<H>  4.9   |  19  |  0.6<L>      Calcium: 8.5 mg/dL (07-06-23 @ 01:58)      LFTs:             6.6  | 0.4  | 42       ------------------[102     ( 06 Jul 2023 01:58 )  3.8  | 0.2  | 85          Lipase:x      Amylase:x             Coags:     x      ----< x       ( 05 Jul 2023 12:40 )     32.8                Urinalysis Basic - ( 06 Jul 2023 01:58 )    Color: x / Appearance: x / SG: x / pH: x  Gluc: 164 mg/dL / Ketone: x  / Bili: x / Urobili: x   Blood: x / Protein: x / Nitrite: x   Leuk Esterase: x / RBC: x / WBC x   Sq Epi: x / Non Sq Epi: x / Bacteria: x                IMAGING:

## 2023-07-06 NOTE — PROGRESS NOTE ADULT - ATTENDING COMMENTS
ACS Attending  Note Attestation    Patient is examined and evaluated at the bedside with the residents/PAs. Treatment plan discussed with the team, nurses, and consulting physicians and consulting teams. Medications, radiological studies and all other relevant studies reviewed.     KATI RAYMUNDO Patient is a 37y old  Female who presents with a chief complaint of Gallstone Pancreatitis     Vital Signs Last 24 Hrs  T(C): 36.5 (04 Jul 2023 08:01), Max: 36.9 (03 Jul 2023 15:12)  T(F): 97.7 (04 Jul 2023 08:01), Max: 98.4 (03 Jul 2023 15:12)  HR: 81 (04 Jul 2023 08:01) (80 - 96)  BP: 136/80 (04 Jul 2023 08:01) (130/81 - 143/82)  BP(mean): --  RR: 18 (04 Jul 2023 00:00) (18 - 18)  SpO2: 97% (04 Jul 2023 00:00) (95% - 97%)    Parameters below as of 04 Jul 2023 00:00  Patient On (Oxygen Delivery Method): room air                        11.3   6.68  )-----------( 300      ( 03 Jul 2023 22:16 )             33.7   07-03    136  |  101  |  4<L>  ----------------------------<  129<H>  3.7   |  22  |  0.5<L>    Ca    8.3<L>      03 Jul 2023 22:16  Phos  2.7     07-03  Mg     2.0     07-03    TPro  5.9<L>  /  Alb  3.5  /  TBili  0.4  /  DBili  0.2  /  AST  21  /  ALT  127<H>  /  AlkPhos  100  07-03    Diagnosis: Gall stone pancreatitis    Plan:	  - supportive care  - GI/DVT prophylaxis  - pain management  - incentive spirometer    - follow up consults-> possible cholecystectomy this admission   - repeat studies as needed  - replace electrolytes  - case management evaluation     Apoorva Vaughn MD, FACS  Trauma/ACS/Surgical Critical Care Attending
doing fine. to OR for lap zay.
feels a little better. vitals stable. abdomen soft, mildly tender in epigastric area.   continue same plan. will follow.
feels better. no more pain.   improving.  plan- OR for lap zay.
feels ok. diet. LFTs ok. possible d/c home.
pt w/ acute pancreatitis likely 2/2 gallstones (elevated ALT) w/ -ve MRCP for cbd stones. clinically improving w/ downtrending LFTs.   -IVF, ccy today per surgery; rest of recs per above note.
ACS Attending  Note Attestation    Patient is examined and evaluated at the bedside with the residents/PAs. Treatment plan discussed with the team, nurses, and consulting physicians and consulting teams. Medications, radiological studies and all other relevant studies reviewed.     KATI RAYMUNDO Patient is a 37y old  Female who presents with a chief complaint of Gallstone Pancreatitis       Vital Signs Last 24 Hrs  T(C): 37.3 (02 Jul 2023 08:36), Max: 37.9 (01 Jul 2023 13:00)  T(F): 99.1 (02 Jul 2023 08:36), Max: 100.3 (01 Jul 2023 13:00)  HR: 96 (02 Jul 2023 10:15) (96 - 115)  BP: 117/58 (02 Jul 2023 08:36) (110/54 - 129/68)  BP(mean): 76 (01 Jul 2023 13:00) (76 - 85)  RR: 18 (02 Jul 2023 10:15) (18 - 30)  SpO2: 96% (02 Jul 2023 10:15) (94% - 97%)    Parameters below as of 02 Jul 2023 10:15  Patient On (Oxygen Delivery Method): room air                            11.4   9.62  )-----------( 241      ( 02 Jul 2023 07:07 )             33.5     07-02    135  |  101  |  4<L>  ----------------------------<  77  3.8   |  20  |  0.5<L>    Ca    7.7<L>      02 Jul 2023 07:07  Mg     1.9     07-02    TPro  5.7<L>  /  Alb  3.3<L>  /  TBili  0.8  /  DBili  x   /  AST  43<H>  /  ALT  219<H>  /  AlkPhos  113  07-02      Diagnosis: Gall stone pancreatitis    Plan:	  - supportive care  - GI/DVT prophylaxis  - pain management  - incentive spirometer    - follow up consults-> possible cholecystectomy this admission vs outpatient   - repeat studies as needed  - replace electrolytes  - case management evaluation     Apoorva Vaughn MD, FACS  Trauma/ACS/Surgical Critical Care Attending
I edited the note
agree w/ above - pt w/ acute pancreatitis likely 2/2 gallstones (elevated ALT) w/ -ve MRCP for cbd stones.   clinically improving w/ downtrending LFTs.   -advance diet; interval ccy; rest of recs per above note.

## 2023-07-06 NOTE — DISCHARGE NOTE PROVIDER - NSDCCPCAREPLAN_GEN_ALL_CORE_FT
PRINCIPAL DISCHARGE DIAGNOSIS  Diagnosis: Pancreatitis  Assessment and Plan of Treatment: s/p laparoscopic cholecystectomy   Activity: No heavy lifting > 10 lbs for 2 weeks. Avoid straining or excessive activity x 6 weeks.   Dressings: Remove outer dressings in 48 hours and steri strips underneath will fall off on their own. Do not scrub wounds. You may shower but do not bathe. May use ice packs for pain and swelling.   Pain control: You may take over-the-counter tylenol and motrin three times per day with food for up to 3 days. Oxycodone was sent to your pharmacy. Please do not drive, operate machinery, or make important decisions while taking this medication. Please take only for severe pain.   Follow up: Please call the number provided to make an appointment with Dr. Gimenez in 1-2 weeks. Please call with any questions or concerns including fevers, worsening pain, pus from the wounds, or redness of the skin.         SECONDARY DISCHARGE DIAGNOSES  Diagnosis: Cholecystitis  Assessment and Plan of Treatment:      PRINCIPAL DISCHARGE DIAGNOSIS  Diagnosis: Pancreatitis  Assessment and Plan of Treatment: s/p laparoscopic cholecystectomy   Activity: No heavy lifting > 10 lbs for 2 weeks. Avoid straining or excessive activity x 6 weeks.   Dressings: Remove outer dressings in 48 hours and steri strips underneath will fall off on their own. Do not scrub wounds. You may shower but do not bathe. May use ice packs for pain and swelling.   Pain control: You may take over-the-counter tylenol and motrin three times per day with food for up to 3 days.    Follow up: Please call the number provided to make an appointment with Dr. Gimenez in 1-2 weeks. Please call with any questions or concerns including fevers, worsening pain, pus from the wounds, or redness of the skin.         SECONDARY DISCHARGE DIAGNOSES  Diagnosis: Cholecystitis  Assessment and Plan of Treatment:

## 2023-07-06 NOTE — PROGRESS NOTE ADULT - REASON FOR ADMISSION
Gallstone Pancreatitis

## 2023-07-06 NOTE — PROGRESS NOTE ADULT - ASSESSMENT
ASSESSMENT:  36 y/o F w/ PMHx of pancreatitis and gallstones. Presented to ED on 6/30/23 with abdominal pain and diagnosed with gallstone pancreatitis.     PLAN:   - Pain control   - Low fat diet   - Encourage ambulation   - Dispo planning     [phone]

## 2023-07-06 NOTE — DISCHARGE NOTE NURSING/CASE MANAGEMENT/SOCIAL WORK - PATIENT PORTAL LINK FT
You can access the FollowMyHealth Patient Portal offered by [facility] by registering at the following website: [email or web address]. By joining [facility]s FollowMyHealth portal, you will also be able to view your health information using other applications (apps) compatible with our system.

## 2023-07-07 LAB — SURGICAL PATHOLOGY STUDY: SIGNIFICANT CHANGE UP

## 2023-07-12 DIAGNOSIS — R73.03 PREDIABETES: ICD-10-CM

## 2023-07-12 DIAGNOSIS — K83.8 OTHER SPECIFIED DISEASES OF BILIARY TRACT: ICD-10-CM

## 2023-07-12 DIAGNOSIS — R65.11 SYSTEMIC INFLAMMATORY RESPONSE SYNDROME (SIRS) OF NON-INFECTIOUS ORIGIN WITH ACUTE ORGAN DYSFUNCTION: ICD-10-CM

## 2023-07-12 DIAGNOSIS — K80.00 CALCULUS OF GALLBLADDER WITH ACUTE CHOLECYSTITIS WITHOUT OBSTRUCTION: ICD-10-CM

## 2023-07-12 DIAGNOSIS — E66.9 OBESITY, UNSPECIFIED: ICD-10-CM

## 2023-07-12 DIAGNOSIS — E78.5 HYPERLIPIDEMIA, UNSPECIFIED: ICD-10-CM

## 2023-07-12 DIAGNOSIS — R00.0 TACHYCARDIA, UNSPECIFIED: ICD-10-CM

## 2023-07-12 DIAGNOSIS — K85.10 BILIARY ACUTE PANCREATITIS WITHOUT NECROSIS OR INFECTION: ICD-10-CM

## 2023-07-12 DIAGNOSIS — K76.0 FATTY (CHANGE OF) LIVER, NOT ELSEWHERE CLASSIFIED: ICD-10-CM

## 2023-07-12 DIAGNOSIS — K36 OTHER APPENDICITIS: ICD-10-CM

## 2023-07-28 PROBLEM — K85.90 ACUTE PANCREATITIS WITHOUT NECROSIS OR INFECTION, UNSPECIFIED: Chronic | Status: ACTIVE | Noted: 2023-06-30

## 2023-08-02 ENCOUNTER — APPOINTMENT (OUTPATIENT)
Dept: SURGERY | Facility: CLINIC | Age: 38
End: 2023-08-02
Payer: MEDICAID

## 2023-08-02 VITALS
HEART RATE: 98 BPM | SYSTOLIC BLOOD PRESSURE: 116 MMHG | HEIGHT: 63 IN | BODY MASS INDEX: 40.75 KG/M2 | DIASTOLIC BLOOD PRESSURE: 80 MMHG | WEIGHT: 230 LBS | OXYGEN SATURATION: 98 %

## 2023-08-02 PROCEDURE — 99024 POSTOP FOLLOW-UP VISIT: CPT

## 2024-12-11 NOTE — BRIEF OPERATIVE NOTE - PRIMARY SURGEON
Pt enrolled in [facility] for management of Personal history of PE (pulmonary embolism) [Z86.711].     Pt current location in clinic.     Current anticoagulant: Warfarin    Time since last visit: 4 weeks    Last INR: 2.7 on warfarin 32.5 mg in the previous week. No changes were made at that time; however did have tooth extraction Monday and held warfarin 2 days prior.    Last Creatinine:   Lab Results   Component Value Date    CREATININE 1.20 (H) 07/03/2024     Last hemoglobin/hematocrit:  Lab Results   Component Value Date    HGB 13.7 07/03/2024     Lab Results   Component Value Date    HCT 43.7 07/03/2024       Current INR: 1.3 is SUBtherapeutic for goal range of 2.0-3.0 and is reflective of 27.5 mg in the previous week prior to visit with usual dose of 37.5mg weekly but 2 held doses for tooth extraction.    Dosing confirmed with patient  Patient denies any missed doses.  Patient denies any diet changes, or OTC/herbal supplement changes since last visit - she is on amoxil (day 3 of 7) and methylprednisolone (day 3 of 6) from dentist.  Patient denies any adverse reactions or barriers.  Patient denies any CP/SOB, fatigue, bleeding or bruising since last visit.   Patient denies any change in alcohol or tobacco use since last visit.   Patient denies any upcoming medical or dental procedures - reports no excessive bleeding from the extraction, but notes that it was rather hard to get the tooth out. Has had no issues with brushing/flossing.    Plan:  Patient was instructed to  take 10mg today, 7.5mg tomorrow, then resume usual dosing .  INR follow up will occur in 5 days.  Patient was instructed to maintain consistent vegetable intake, to monitor for any bruising or bleeding, and to call with any medication changes or concerns.    Pt handout given with above information    Vick Tabares PharmD  P:[phone]  F:[phone]  
Dr. Gimenez

## 2025-06-12 NOTE — ED ADULT NURSE NOTE - DOES PATIENT HAVE ADVANCE DIRECTIVE
Additional History: Pt reports swelling redness on face that has been going on for about a year, sometimes better than other days. Not affected by sunlight.
No